# Patient Record
Sex: FEMALE | Race: WHITE | NOT HISPANIC OR LATINO | Employment: OTHER | URBAN - METROPOLITAN AREA
[De-identification: names, ages, dates, MRNs, and addresses within clinical notes are randomized per-mention and may not be internally consistent; named-entity substitution may affect disease eponyms.]

---

## 2017-04-12 ENCOUNTER — OFFICE VISIT (OUTPATIENT)
Dept: OPHTHALMOLOGY | Facility: CLINIC | Age: 72
End: 2017-04-12
Payer: MEDICARE

## 2017-04-12 DIAGNOSIS — H25.11 SENILE NUCLEAR SCLEROSIS, RIGHT: ICD-10-CM

## 2017-04-12 DIAGNOSIS — H35.3190 NONEXUDATIVE SENILE MACULAR DEGENERATION OF RETINA: ICD-10-CM

## 2017-04-12 DIAGNOSIS — H35.372 EPIRETINAL MEMBRANE, LEFT: ICD-10-CM

## 2017-04-12 DIAGNOSIS — H25.12 SENILE NUCLEAR SCLEROSIS, LEFT: Primary | ICD-10-CM

## 2017-04-12 PROCEDURE — 92136 OPHTHALMIC BIOMETRY: CPT | Mod: LT,S$GLB,, | Performed by: OPHTHALMOLOGY

## 2017-04-12 PROCEDURE — 99999 PR PBB SHADOW E&M-EST. PATIENT-LVL II: CPT | Mod: PBBFAC,,, | Performed by: OPHTHALMOLOGY

## 2017-04-12 PROCEDURE — 92014 COMPRE OPH EXAM EST PT 1/>: CPT | Mod: S$GLB,,, | Performed by: OPHTHALMOLOGY

## 2017-04-12 RX ORDER — POLYMYXIN B SULFATE AND TRIMETHOPRIM 1; 10000 MG/ML; [USP'U]/ML
1 SOLUTION OPHTHALMIC EVERY 4 HOURS
Qty: 1 BOTTLE | Refills: 1 | Status: SHIPPED | OUTPATIENT
Start: 2017-04-12 | End: 2017-04-22

## 2017-04-12 RX ORDER — DIFLUPREDNATE OPHTHALMIC 0.5 MG/ML
1 EMULSION OPHTHALMIC 4 TIMES DAILY
Qty: 1 BOTTLE | Refills: 1 | Status: SHIPPED | OUTPATIENT
Start: 2017-04-12 | End: 2017-05-12

## 2017-04-12 NOTE — PROGRESS NOTES
HPI     Last saw [facility] in 2015 patient c/o blurred vision at night when driving due   to headlight glare.       Last edited by MARIA E tOt on 4/12/2017  9:49 AM.         Assessment /Plan     For exam results, see Encounter Report.      ICD-10-CM ICD-9-CM    1. Senile nuclear sclerosis, left H25.12 366.16 Visually Significant Cataract OS > OD  Patient reports decreased vision consistent with the clinical amount of lenticular opacity, which reaches the level of visual significance and affects activities of daily living including reading and glare. Risks, benefits, and alternatives to cataract surgery were discussed.   The pt expressed a desire to proceed with surgery with the potential for some reasonable degree of visual improvement.    Discussed IOL options and refractive outcomes for this patient.    Phaco left eye,   pt defers specialty lenses   Block  monofocal IOL.  Will aim for distance  Referral to [facility] for Ophthalmic surgery  Prescriptions sent for preoperative medications  Durezol, Polytrim, and Ilevro  Explained that patient may need glasses after surgery.  Discussed that vision may be limited by retina          2. Senile nuclear sclerosis, right H25.11 366.16 See above    3. Epiretinal membrane, left H35.372 362.56 Appears stable, may limit vision after phaco    4. Nonexudative senile macular degeneration of retina H35.3190 362.51 Stable, yet may limit vision after phaco

## 2017-04-21 ENCOUNTER — OFFICE VISIT (OUTPATIENT)
Dept: OPHTHALMOLOGY | Facility: CLINIC | Age: 72
End: 2017-04-21
Payer: MEDICARE

## 2017-04-21 DIAGNOSIS — Z98.42 CATARACT EXTRACTION STATUS, LEFT: Primary | ICD-10-CM

## 2017-04-21 PROCEDURE — 99024 POSTOP FOLLOW-UP VISIT: CPT | Mod: S$GLB,,, | Performed by: OPHTHALMOLOGY

## 2017-04-21 NOTE — PROGRESS NOTES
"HPI     LAST SAW [facility] 12/29/15- REFERRED BY [facility] IN 2015  AA SMD  PCIOL OS 4/21/2017   PVD, floaters  No holes or tears, looks good  Dry "AA" SMD       Last edited by Zach Kearney MD on 4/21/2017 11:24 AM.         Assessment /Plan     For exam results, see Encounter Report.      ICD-10-CM ICD-9-CM    1. Cataract extraction status, left Z98.42 V45.61        PO Day 1 S/P Phaco/IOL  left eye  Doing well.    Use Durezol QID  Ilevro daily  Polymyxin B 4 x day  Reinstructed in importance of absolute compliance with Post-OP instructions including medications, shield at bedtime, and limitation of activities. Follow up appointments in approximately one and six weeks or call immediately for increased pain, redness or vision loss.                       "

## 2017-04-26 ENCOUNTER — OFFICE VISIT (OUTPATIENT)
Dept: OPHTHALMOLOGY | Facility: CLINIC | Age: 72
End: 2017-04-26
Payer: MEDICARE

## 2017-04-26 DIAGNOSIS — H25.11 SENILE NUCLEAR SCLEROSIS, RIGHT: ICD-10-CM

## 2017-04-26 DIAGNOSIS — Z98.42 CATARACT EXTRACTION STATUS, LEFT: Primary | ICD-10-CM

## 2017-04-26 PROCEDURE — 99024 POSTOP FOLLOW-UP VISIT: CPT | Mod: S$GLB,,, | Performed by: OPHTHALMOLOGY

## 2017-04-26 PROCEDURE — 99999 PR PBB SHADOW E&M-EST. PATIENT-LVL I: CPT | Mod: PBBFAC,,, | Performed by: OPHTHALMOLOGY

## 2017-04-26 RX ORDER — CIPROFLOXACIN 500 MG/1
TABLET ORAL
COMMUNITY
Start: 2017-04-24 | End: 2019-04-22 | Stop reason: ALTCHOICE

## 2017-04-26 RX ORDER — CHOLECALCIFEROL (VITAMIN D3) 125 MCG
1000 TABLET ORAL
COMMUNITY

## 2017-04-26 RX ORDER — ONDANSETRON 4 MG/1
4 TABLET, FILM COATED ORAL
COMMUNITY
Start: 2016-09-15 | End: 2018-04-16

## 2017-04-26 RX ORDER — METRONIDAZOLE 500 MG/1
TABLET ORAL
COMMUNITY
Start: 2017-04-24 | End: 2018-04-16

## 2017-04-26 RX ORDER — FLUTICASONE FUROATE AND VILANTEROL 200; 25 UG/1; UG/1
1 POWDER RESPIRATORY (INHALATION)
COMMUNITY
Start: 2017-03-16 | End: 2018-04-16

## 2017-04-26 NOTE — PROGRESS NOTES
"HPI     Post-op Evaluation    Additional comments: OS DUREZOL QID, POLYTRIM QID, ILEVRO ONCE DAILY           Comments   Pt states that her vision was really good, but when she woke up today, she   noticed her vision is a bit blurrier than it was. She states that she was   also feeling some scratchiness. 100% compliant with gtts. No pain.     LAST SAW [facility] 12/29/15- REFERRED BY [facility] IN 2015  AA SMD  PCIOL OS SN60WF+22.5 /CDE 12.69  PVD, floaters  No holes or tears, looks good  Dry "AA" SMD        OS DUREZOL QID, POLYTRIM QID, ILEVRO ONCE DAILY       Last edited by Erik Brink, Patient Care Assistant on 4/26/2017  3:08   PM. (History)            Assessment /Plan     For exam results, see Encounter Report.      ICD-10-CM ICD-9-CM    1. Cataract extraction status, left Z98.42 V45.61 Myopic outcome - unexplained   iol placed was the intended iol  Consider Ora in the future   2. Senile nuclear sclerosis, right H25.11 366.16        PO Week 1 S/P Phaco/ IOL left eye:   Doing well with no evidence of infection or abnormal inflammation.     D/C antibiotic drops  Taper Durezol weekly per instruction sheet.  Ilevro daily 7 more days.  Resume normal activitites and d/c eye shield.  OTC reading glasses can be used until evaluated for final MR.  D/c Shield at night    RTC 5 weeks or PRN pain, redness, vision loss, or other concerns.                     "

## 2017-05-31 ENCOUNTER — OFFICE VISIT (OUTPATIENT)
Dept: OPHTHALMOLOGY | Facility: CLINIC | Age: 72
End: 2017-05-31
Payer: MEDICARE

## 2017-05-31 DIAGNOSIS — H25.11 SENILE NUCLEAR SCLEROSIS, RIGHT: ICD-10-CM

## 2017-05-31 DIAGNOSIS — Z98.42 CATARACT EXTRACTION STATUS, LEFT: Primary | ICD-10-CM

## 2017-05-31 PROCEDURE — 99024 POSTOP FOLLOW-UP VISIT: CPT | Mod: S$GLB,,, | Performed by: OPHTHALMOLOGY

## 2017-05-31 PROCEDURE — 92136 OPHTHALMIC BIOMETRY: CPT | Mod: 26,RT,S$GLB, | Performed by: OPHTHALMOLOGY

## 2017-05-31 PROCEDURE — 99999 PR PBB SHADOW E&M-EST. PATIENT-LVL II: CPT | Mod: PBBFAC,,, | Performed by: OPHTHALMOLOGY

## 2017-05-31 RX ORDER — POLYMYXIN B SULFATE AND TRIMETHOPRIM 1; 10000 MG/ML; [USP'U]/ML
1 SOLUTION OPHTHALMIC 4 TIMES DAILY
Qty: 1 BOTTLE | Refills: 1 | Status: SHIPPED | OUTPATIENT
Start: 2017-05-31 | End: 2017-06-10

## 2017-05-31 RX ORDER — DIFLUPREDNATE OPHTHALMIC 0.5 MG/ML
1 EMULSION OPHTHALMIC 4 TIMES DAILY
Qty: 1 BOTTLE | Refills: 0 | Status: SHIPPED | OUTPATIENT
Start: 2017-05-31 | End: 2017-06-30

## 2017-05-31 NOTE — PROGRESS NOTES
HPI     Patient states her vision in OS is good and ready to schedule Phaco OD.   C/o trouble driving    Last edited by Zach Kearney MD on 5/31/2017 11:44 AM. (History)            Assessment /Plan     For exam results, see Encounter Report.      ICD-10-CM ICD-9-CM    1. Cataract extraction status, left Z98.42 V45.61 Mild inflammation. Resume durezol   2. Senile nuclear sclerosis, right H25.11 366.16   Patient reports decreased vision in the fellow eye consistent with the clinical amount of lenticular opacity, which reaches the level of visual significance and affects activities of daily living including reading and glare. Risks, benefits, and alternatives to cataract surgery were discussed and pt desired to schedule cataract surgery. Pt was consented and the biometry and lens options were reviewed.    Phaco right   Block   Requests a monofical  IOL.  Will aim for distance  Patient given prescriptions for Polytrim, Durezol, and Ilevro  Explained that patient may need glasses after surgery.  Discussed that vision may be limited by astigmatism    Declined toric. +22.0 WF        IOL Master - MOD 26 - OD- Right eye      Ambulatory Referral to External Surgery      polymyxin B sulf-trimethoprim (POLYTRIM) 10,000 unit- 1 mg/mL Drop      nepafenac (ILEVRO) 0.3 % DrpS      difluprednate (DUREZOL) 0.05 % Drop ophthalmic solution       Resume durezol bid os due to mild inflammation today.     Return to clinic for cataract surgery od.   Myopic outcome os.

## 2017-06-09 ENCOUNTER — OFFICE VISIT (OUTPATIENT)
Dept: OPHTHALMOLOGY | Facility: CLINIC | Age: 72
End: 2017-06-09
Payer: MEDICARE

## 2017-06-09 DIAGNOSIS — Z98.41 CATARACT EXTRACTION STATUS, RIGHT: Primary | ICD-10-CM

## 2017-06-09 PROCEDURE — 99999 PR PBB SHADOW E&M-EST. PATIENT-LVL I: CPT | Mod: PBBFAC,,, | Performed by: OPHTHALMOLOGY

## 2017-06-09 PROCEDURE — 99024 POSTOP FOLLOW-UP VISIT: CPT | Mod: S$GLB,,, | Performed by: OPHTHALMOLOGY

## 2017-06-09 NOTE — PROGRESS NOTES
"HPI     Post-op Evaluation    Additional comments: OD: Durezol QID, Polytrim QID, Ilevro QD           Comments   Pt states that she has some twitching in the temporal side of her right   eye. A little funny feeling, but no pain. VA improved. Pt is knowledgeable   of regiment.     LAST SAW [facility] 12/29/15- REFERRED BY [facility] IN 2015  AA SMD  PCIOL OS SN60WF+22.5 / 04/21/17 CDE 12.69  PCIOL OD 6/8/17  PVD, floaters  No holes or tears, looks good  Dry "AA" SMD    OD: Durezol QID, Polytrim QID, Ilevro QD       Last edited by Erik Brink, Patient Care Assistant on 6/9/2017  1:09   PM. (History)            Assessment /Plan     For exam results, see Encounter Report.      ICD-10-CM ICD-9-CM    1. Cataract extraction status, right Z98.41 V45.61        PO Day 1 S/P Phaco/IOL  right eye  Doing well.    Use Durezol QID  Ilevro daily  Polymyxin B 4 x day  Reinstructed in importance of absolute compliance with Post-OP instructions including medications, shield at bedtime, and limitation of activities. Follow up appointments in approximately one and six weeks or call immediately for increased pain, redness or vision loss.                       "

## 2017-06-19 ENCOUNTER — OFFICE VISIT (OUTPATIENT)
Dept: OPHTHALMOLOGY | Facility: CLINIC | Age: 72
End: 2017-06-19
Payer: MEDICARE

## 2017-06-19 DIAGNOSIS — Z98.42 CATARACT EXTRACTION STATUS, LEFT: Primary | ICD-10-CM

## 2017-06-19 DIAGNOSIS — Z98.41 CATARACT EXTRACTION STATUS, RIGHT: ICD-10-CM

## 2017-06-19 PROCEDURE — 99999 PR PBB SHADOW E&M-EST. PATIENT-LVL I: CPT | Mod: PBBFAC,,, | Performed by: OPHTHALMOLOGY

## 2017-06-19 PROCEDURE — 99024 POSTOP FOLLOW-UP VISIT: CPT | Mod: S$GLB,,, | Performed by: OPHTHALMOLOGY

## 2017-06-19 NOTE — PROGRESS NOTES
"HPI     s/pPCIOL OD +22.0 SN60WF / CDE: 10.25 / 6/8/17.  PCIOL OS SN60WF+22.5 /   04/21/17 CDE 12.69.  Pt hit head on a metal sign last Monday, had a   concussion.  No changes in vision per pt.      AA SMD  PCIOL OS SN60WF+22.5 / 04/21/17 CDE 12.69  PCIOL OD +22.0 SN60WF / CDE: 10.25 / 6/8/17  PVD, floaters  No holes or tears, looks good  Dry "AA" SMD    Durezol qid OD.      Last edited by Zohreh Beatty on 6/19/2017 10:32 AM. (History)            Assessment /Plan     For exam results, see Encounter Report.      ICD-10-CM ICD-9-CM    1. Cataract extraction status, left Z98.42 V45.61    2. Cataract extraction status, right Z98.41 V45.61        PO Week 1 S/P Phaco/ IOL left eye:   Doing well with no evidence of infection or abnormal inflammation.     D/C antibiotic drops  Taper Durezol weekly per instruction sheet.  Ilevro daily 7 more days.  Resume normal activitites and d/c eye shield.  OTC reading glasses can be used until evaluated for final MR.  D/c Shield at night    RTC 4 weeks or PRN pain, redness, vision loss, or other concerns.                     "

## 2017-07-17 ENCOUNTER — OFFICE VISIT (OUTPATIENT)
Dept: OPHTHALMOLOGY | Facility: CLINIC | Age: 72
End: 2017-07-17
Payer: MEDICARE

## 2017-07-17 DIAGNOSIS — Z98.42 CATARACT EXTRACTION STATUS, LEFT: ICD-10-CM

## 2017-07-17 DIAGNOSIS — H35.3190 NONEXUDATIVE SENILE MACULAR DEGENERATION OF RETINA: ICD-10-CM

## 2017-07-17 DIAGNOSIS — Z98.41 CATARACT EXTRACTION STATUS, RIGHT: Primary | ICD-10-CM

## 2017-07-17 DIAGNOSIS — H35.372 EPIRETINAL MEMBRANE, LEFT: ICD-10-CM

## 2017-07-17 PROCEDURE — 99024 POSTOP FOLLOW-UP VISIT: CPT | Mod: S$GLB,,, | Performed by: OPHTHALMOLOGY

## 2017-07-17 NOTE — PROGRESS NOTES
"HPI     C/c pt using otc readers, +3.00 but they give her a headache after   reading awhile- using  smaller strength and cant see well out of those   either- wants new glasses rx     LAST SAW [facility] 12/29/15- REFERRED BY [facility] IN 2015  AA SMD  PCIOL OS SN60WF+22.5 / 04/21/17 CDE 12.69  PCIOL OD +22.0 SN60WF / CDE: 10.25 / 6/8/17  PVD, floaters  No holes or tears, looks good  Dry "AA" SMD    Last edited by Zach Kearney MD on 7/17/2017 10:53 AM. (History)            Assessment /Plan     For exam results, see Encounter Report.      ICD-10-CM ICD-9-CM    1. Cataract extraction status, left Z98.42 V45.61 PO Month 1 S/P Phaco/IOL bilateral eye:   Doing Well.    Discontinue medications as per taper sheet.  Dispense PAL Rx  RTC 6 months with Dr. Esquivel         2. Cataract extraction status, right Z98.41 V45.61 Well   3. Nonexudative senile macular degeneration of retina H35.3190 362.51 Followed by [facility]    4. Epiretinal membrane, left H35.372 362.56 Followed by [facility]        RETURN TO CLINIC 9 months with Dr Esquivel to resume care               "

## 2017-11-17 ENCOUNTER — TELEPHONE (OUTPATIENT)
Dept: GASTROENTEROLOGY | Facility: CLINIC | Age: 72
End: 2017-11-17

## 2017-11-17 DIAGNOSIS — R13.10 DYSPHAGIA, UNSPECIFIED TYPE: ICD-10-CM

## 2017-11-17 DIAGNOSIS — K22.70 BARRETT'S ESOPHAGUS DETERMINED BY ENDOSCOPY: Primary | ICD-10-CM

## 2017-11-17 NOTE — TELEPHONE ENCOUNTER
----- Message from Sorin Benton MD sent at 11/17/2017  9:23 AM CST -----  Regarding: RE: Outside patient referral  Reina/Rosi- Reviewed records. I think this would be a good case for EGD bx and possible EMR. VLE would also be good, so perhaps we can aim to do this with Dr. Pastrana at [facility]. He is there next week, so can we try to get her scheduled with him there next week?    ----- Message -----  From: Jennifer Pearson MA  Sent: 11/16/2017   5:02 PM  To: Sorin Benton MD  Subject: FW: Outside patient referral                     Please review and advise  ----- Message -----  From: Nora Nova RN  Sent: 11/16/2017   4:51 PM  To: Jennifer Pearson MA, Ginette Samayoa  Subject: RE: Outside patient referral                     Hi Ginette,  This is for AES.    Reina,  This is the pt we spoke about.    Thank you,  Nora  ----- Message -----  From: Ginette Samayoa  Sent: 11/16/2017  10:35 AM  To: Nora Nova, RN  Subject: Outside patient referral                         Hi!    Patient being referred for dysplasia at the [facility]. The referral was to Dr. Benton and you. I was going to route to Dr. Brown but wanted check with you to make sure I'm not missing something with this patient. I scanned the referral into  and was going to send over a referral checklist for additional records.    Thanks!  Ginette

## 2017-11-22 ENCOUNTER — SURGERY (OUTPATIENT)
Age: 72
End: 2017-11-22

## 2017-11-22 ENCOUNTER — ANESTHESIA (OUTPATIENT)
Dept: ENDOSCOPY | Facility: HOSPITAL | Age: 72
End: 2017-11-22
Payer: MEDICARE

## 2017-11-22 ENCOUNTER — HOSPITAL ENCOUNTER (OUTPATIENT)
Facility: HOSPITAL | Age: 72
Discharge: HOME | End: 2017-11-22
Attending: INTERNAL MEDICINE | Admitting: INTERNAL MEDICINE
Payer: MEDICARE

## 2017-11-22 ENCOUNTER — ANESTHESIA EVENT (OUTPATIENT)
Dept: ENDOSCOPY | Facility: HOSPITAL | Age: 72
End: 2017-11-22
Payer: MEDICARE

## 2017-11-22 VITALS
WEIGHT: 145 LBS | SYSTOLIC BLOOD PRESSURE: 139 MMHG | DIASTOLIC BLOOD PRESSURE: 68 MMHG | RESPIRATION RATE: 18 BRPM | HEIGHT: 63 IN | HEART RATE: 59 BPM | TEMPERATURE: 98 F | OXYGEN SATURATION: 99 % | BODY MASS INDEX: 25.69 KG/M2

## 2017-11-22 DIAGNOSIS — K31.9 LESION OF STOMACH: Primary | ICD-10-CM

## 2017-11-22 DIAGNOSIS — Z01.818 PREOP EXAMINATION: ICD-10-CM

## 2017-11-22 LAB
ANION GAP SERPL CALC-SCNC: 10 MMOL/L
BUN SERPL-MCNC: 11 MG/DL
CALCIUM SERPL-MCNC: 9.5 MG/DL
CHLORIDE SERPL-SCNC: 107 MMOL/L
CO2 SERPL-SCNC: 25 MMOL/L
CREAT SERPL-MCNC: 0.9 MG/DL
EST. GFR  (AFRICAN AMERICAN): >60 ML/MIN/1.73 M^2
EST. GFR  (NON AFRICAN AMERICAN): >60 ML/MIN/1.73 M^2
GLUCOSE SERPL-MCNC: 109 MG/DL
POTASSIUM SERPL-SCNC: 4.2 MMOL/L
SODIUM SERPL-SCNC: 142 MMOL/L

## 2017-11-22 PROCEDURE — 27201012 HC FORCEPS, HOT/COLD, DISP: Performed by: INTERNAL MEDICINE

## 2017-11-22 PROCEDURE — 27201693 HC INFLATION SYSTEM, NVISION ULE: Performed by: INTERNAL MEDICINE

## 2017-11-22 PROCEDURE — 43252 EGD OPTICAL ENDOMICROSCOPY: CPT | Mod: 51,,, | Performed by: INTERNAL MEDICINE

## 2017-11-22 PROCEDURE — 43239 EGD BIOPSY SINGLE/MULTIPLE: CPT | Mod: ,,, | Performed by: INTERNAL MEDICINE

## 2017-11-22 PROCEDURE — 37000008 HC ANESTHESIA 1ST 15 MINUTES: Performed by: INTERNAL MEDICINE

## 2017-11-22 PROCEDURE — 63600175 PHARM REV CODE 636 W HCPCS: Performed by: NURSE ANESTHETIST, CERTIFIED REGISTERED

## 2017-11-22 PROCEDURE — 43239 EGD BIOPSY SINGLE/MULTIPLE: CPT | Performed by: INTERNAL MEDICINE

## 2017-11-22 PROCEDURE — 43252 EGD OPTICAL ENDOMICROSCOPY: CPT | Performed by: INTERNAL MEDICINE

## 2017-11-22 PROCEDURE — 93010 ELECTROCARDIOGRAM REPORT: CPT | Mod: ,,, | Performed by: INTERNAL MEDICINE

## 2017-11-22 PROCEDURE — 25000003 PHARM REV CODE 250: Performed by: INTERNAL MEDICINE

## 2017-11-22 PROCEDURE — 88312 SPECIAL STAINS GROUP 1: CPT | Mod: 26,,, | Performed by: PATHOLOGY

## 2017-11-22 PROCEDURE — 88305 TISSUE EXAM BY PATHOLOGIST: CPT | Performed by: PATHOLOGY

## 2017-11-22 PROCEDURE — 88305 TISSUE EXAM BY PATHOLOGIST: CPT | Mod: 26,,, | Performed by: PATHOLOGY

## 2017-11-22 PROCEDURE — 80048 BASIC METABOLIC PNL TOTAL CA: CPT

## 2017-11-22 PROCEDURE — 27201694 HC PROBE, OPTICAL ENDOMICROSCOPY: Performed by: INTERNAL MEDICINE

## 2017-11-22 PROCEDURE — 37000009 HC ANESTHESIA EA ADD 15 MINS: Performed by: INTERNAL MEDICINE

## 2017-11-22 PROCEDURE — 93005 ELECTROCARDIOGRAM TRACING: CPT

## 2017-11-22 RX ORDER — SODIUM CHLORIDE 9 MG/ML
INJECTION, SOLUTION INTRAVENOUS CONTINUOUS
Status: DISCONTINUED | OUTPATIENT
Start: 2017-11-22 | End: 2017-11-22 | Stop reason: HOSPADM

## 2017-11-22 RX ORDER — LIDOCAINE HCL/PF 100 MG/5ML
SYRINGE (ML) INTRAVENOUS
Status: DISCONTINUED | OUTPATIENT
Start: 2017-11-22 | End: 2017-11-22

## 2017-11-22 RX ORDER — PROPOFOL 10 MG/ML
VIAL (ML) INTRAVENOUS
Status: DISCONTINUED | OUTPATIENT
Start: 2017-11-22 | End: 2017-11-22

## 2017-11-22 RX ORDER — FENTANYL CITRATE 50 UG/ML
INJECTION, SOLUTION INTRAMUSCULAR; INTRAVENOUS
Status: DISCONTINUED | OUTPATIENT
Start: 2017-11-22 | End: 2017-11-22

## 2017-11-22 RX ORDER — PROPOFOL 10 MG/ML
VIAL (ML) INTRAVENOUS CONTINUOUS PRN
Status: DISCONTINUED | OUTPATIENT
Start: 2017-11-22 | End: 2017-11-22

## 2017-11-22 RX ADMIN — SODIUM CHLORIDE: 0.9 INJECTION, SOLUTION INTRAVENOUS at 07:11

## 2017-11-22 RX ADMIN — FENTANYL CITRATE 50 MCG: 50 INJECTION, SOLUTION INTRAMUSCULAR; INTRAVENOUS at 09:11

## 2017-11-22 RX ADMIN — LIDOCAINE HYDROCHLORIDE 75 MG: 20 INJECTION, SOLUTION INTRAVENOUS at 09:11

## 2017-11-22 RX ADMIN — PROPOFOL 40 MG: 10 INJECTION, EMULSION INTRAVENOUS at 09:11

## 2017-11-22 RX ADMIN — PROPOFOL 30 MG: 10 INJECTION, EMULSION INTRAVENOUS at 09:11

## 2017-11-22 RX ADMIN — FENTANYL CITRATE 25 MCG: 50 INJECTION, SOLUTION INTRAMUSCULAR; INTRAVENOUS at 09:11

## 2017-11-22 RX ADMIN — PROPOFOL 125 MCG/KG/MIN: 10 INJECTION, EMULSION INTRAVENOUS at 09:11

## 2017-11-22 NOTE — DISCHARGE INSTRUCTIONS
Post EGD Discharge Instruction    Kalia Woods  11/22/2017  Ravinder Pastrana MD    RESTRICTIONS ON ACTIVITY:    -DO NOT drive a car, operate machinery or make critical decisions, or do activities that require coordination or balance for 24 hours.  -Following Day: Return to full activities including work.  -Diet: Eat and drink normally unless instructed otherwise.    TREATMENT FOR COMMON SIDE EFFECTS:  *Sore Throat - treat with throat lozenges, gargle with warm salt water.  *Mild abdominal pain & bloating- rest and take liquids only.    SYMPTOMS TO WATCH FOR AND REPORT TO YOUR PHYSICIAN:  1. Chills or fever occurring 24 hours after procedure.  2. Pain in chest.  3. SEVERE abdominal pain or bloating.  4. Rectal bleeding which could be maroon or black.    If you have any questions or problems, please call your Physician:    Ravinder Pastrana MD     If a complication or emergency situation arises and you are unable to reach your Physician - GO TO THE EMERGENCY ROOM.

## 2017-11-22 NOTE — ANESTHESIA PREPROCEDURE EVALUATION
11/22/2017  Kalia Woods is a 72 y.o., female for EGD with bx poss EMR/VLE under MAC/[facility]    Anesthesia Evaluation     I have reviewed the Nursing Notes.   I have reviewed the Medications.     Review of Systems  Anesthesia Hx:  Personal Hx of Anesthesia complications, Post-Operative Nausea/Vomiting (zofran), in the past, but not with recent anesthetics / prophylaxis   Social:  No Alcohol Use, Non-Smoker   Hematology/Oncology:         -- Cancer in past history: Breast surgery    Cardiovascular:   Exercise tolerance: good Hypertension Past MI (10/2016 and 2015) CAD asymptomatic  Angina (stable) hyperlipidemia ECG has been reviewed.    Pulmonary:   Asthma    Renal/:   Chronic Renal Disease, CRI    Hepatic/GI:   powers's   Musculoskeletal:   Arthritis   Spine Disorders: Degenerative disease    Endocrine:   Hypothyroidism        Physical Exam  General:  Well nourished    Airway/Jaw/Neck:  Airway Findings: Mouth Opening: Normal General Airway Assessment: Adult      Dental:  Dental Findings: Periodontal disease, Severe   Chest/Lungs:  Chest/Lungs Clear    Heart/Vascular:  Heart Findings: Normal       Mental Status:  Mental Status Findings:  Alert and Oriented       Lab Results   Component Value Date     11/22/2017    K 4.2 11/22/2017     11/22/2017    CREATININE 0.9 11/22/2017    BUN 11 11/22/2017    CO2 25 11/22/2017           Anesthesia Plan  Type of Anesthesia, risks & benefits discussed:  Anesthesia Type:  MAC, general  Patient's Preference:   Intra-op Monitoring Plan:   Intra-op Monitoring Plan Comments:   Post Op Pain Control Plan:   Post Op Pain Control Plan Comments:   Induction:    Beta Blocker:  Patient is not currently on a Beta-Blocker (No further documentation required).       Informed Consent: Patient understands risks and agrees with Anesthesia plan.  Questions answered. Anesthesia  consent signed with patient.  ASA Score: 3     Day of Surgery Review of History & Physical:            Ready For Surgery From Anesthesia Perspective.

## 2017-11-22 NOTE — DISCHARGE SUMMARY
Discharge Summary/Instructions for after EGD with or without Biopsy    Kalia Woods    Wednesday, November 22, 2017  Ravinder Pastrana MD    RESTRICTIONS ON ACTIVITY:    - DO NOT drive a car or operate machinery until the day after the procedure.  - Following Day:  Return to full activities including work.  - Diet:  Eat and drink normally unless instructed otherwise.    TREATMENT FOR COMMON SIDE EFFECTS:  o  Sore Throat - treat with throat lozenges, gargle with warm salt water.  o  Mild abdominal pain & bloating - rest and eliza liquids only.    SYMPTOMS TO WATCH FOR AND REPORT TO YOUR PHYSICIAN:  1.  Chills or fever occurring within 24 hours after procedure.  2.  Pain in chest.  3.  SEVERE abdominal pain or bloating.  4.  Rectal bledding which bould be maroon or black stools.    Your doctor recommends these additional instructions:    None    You are being discharged to home.   We are waiting for your pathology results.   Your physician has recommended a repeat upper endoscopy in 10 weeks to check healing and for retreatment.      If you have any questions or problems, please call your physician.  EMERGENCY PHONE NUMBER: [phone]  LAB RESULTS: [phone]

## 2017-11-22 NOTE — H&P
History & Physical - Short Stay  Gastroenterology      SUBJECTIVE:     Procedure: EGD    Chief Complaint/Indication for Procedure: Reflux    History of Present Illness:  Patient is a 72 y.o. female presents with GERD S/P Nissen fundoplication. Recent EGD showed evidence of abnormal changes in the cardia with biopsies indeterminate for dysplasia here for further evaluation    PTA Medications   Medication Sig    benazepril (LOTENSIN) 20 MG tablet Take 0 tablets by mouth.    cholecalciferol, vitamin D3, (VITAMIN D3) 1,000 unit Chew Take 0 tablets by mouth.    colchicine 0.6 mg tablet Take 0.6 mg by mouth.    ergocalciferol, vitamin D2, 2,000 unit Tab Take 1,000 Units by mouth.    fluticasone-vilanterol (BREO) 200-25 mcg/dose DsDv diskus inhaler Inhale 1 puff into the lungs.    isosorbide mononitrate (IMDUR) 60 MG 24 hr tablet     levothyroxine (SYNTHROID) 100 MCG tablet Take 100 mcg by mouth.    omeprazole (PRILOSEC) 40 MG capsule Take by mouth.    simvastatin (ZOCOR) 10 MG tablet Take 0 tablets by mouth.    verapamil (CALAN) 120 MG tablet Take 0 tablets by mouth.    zolpidem (AMBIEN) 10 mg Tab Take 1 tablet by mouth.    albuterol (PROVENTIL) 2.5 mg /3 mL (0.083 %) nebulizer solution 2.5 mg.    ciprofloxacin HCl (CIPRO) 500 MG tablet     furosemide (LASIX) 20 MG tablet     metronidazole (FLAGYL) 500 MG tablet     nitroGLYCERIN (NITROSTAT) 0.4 MG SL tablet as needed.    ondansetron (ZOFRAN) 4 MG tablet Take 4 mg by mouth.    potassium chloride SA (K-DUR,KLOR-CON) 10 MEQ tablet        Review of patient's allergies indicates:   Allergen Reactions    Aspirin      Other reaction(s): Shortness Of Breath    Hydrocodone-acetaminophen Anaphylaxis    Penicillins Other (See Comments)    Phenergan [promethazine] Other (See Comments)    Hydrocodone     Iodine and iodide containing products      Other reaction(s): Swelling    Iodine containing multivitamin     Letrozole Other (See Comments)     Depression,  suicidal thoughts.    Umeclidinium-vilanterol      Rapid heart rate, tongue swelling, and leg cramps    Sulfa (sulfonamide antibiotics)      Other reaction(s): Rash        Past Medical History:   Diagnosis Date    Asthma     Breast cancer     Cataract     Degenerative joint disease involving multiple joints 8/19/2013    Elevated cholesterol     Hypertension     Macular degeneration     Thyroid disorder      Past Surgical History:   Procedure Laterality Date    BLADDER SURGERY      BREAST SURGERY      CATARACT EXTRACTION W/  INTRAOCULAR LENS IMPLANT Left 04/20/2017    CATARACT EXTRACTION W/  INTRAOCULAR LENS IMPLANT Right 06/08/2017    HYSTERECTOMY      LAPAROSCOPIC NISSEN FUNDOPLICATION      TONSILLECTOMY       Family History   Problem Relation Age of Onset    Diabetes Sister      Social History   Substance Use Topics    Smoking status: Never Smoker    Smokeless tobacco: Never Used    Alcohol use No       Review of Systems:  Constitutional: no fever or chills  Respiratory: no cough or shortness of breath  Cardiovascular: no chest pain or palpitations  Gastrointestinal: no nausea or vomiting, no abdominal pain or change in bowel habits, positive for reflux symptoms    OBJECTIVE:     Vital Signs (Most Recent)  Temp: 97.7 °F (36.5 °C) (11/22/17 0701)  Pulse: 68 (11/22/17 0701)  Resp: 20 (11/22/17 0701)  BP: (!) 143/68 (11/22/17 0701)  SpO2: 98 % (11/22/17 0701)    Physical Exam:  General: well developed, well nourished  Lungs:  clear to auscultation bilaterally and normal respiratory effort  Heart: regular rate, S1, S2 normal  Abdomen: soft, non-tender non-distented; bowel sounds normal; no masses,  no organomegaly    Laboratory  CBC: No results for input(s): WBC, RBC, HGB, HCT, PLT, MCV, MCH, MCHC in the last 168 hours.  CMP:   Recent Labs  Lab 11/22/17 0707      CALCIUM 9.5      K 4.2   CO2 25      BUN 11   CREATININE 0.9     Coagulation: No results for input(s): LABPROT, INR,  APTT in the last 168 hours.      Diagnostic Results:      ASSESSMENT/PLAN:     Abnormal mucosa of the cardia    Plan: EGD    Anesthesia Plan: MAC    ASA Grade: ASA 2 - Patient with mild systemic disease with no functional limitations     The impression and plan was discussed in detail with the patient and family. All questions have been answered and the patient voices understanding of our plan at this point. The risk of the procedure was discussed in detail which includes but not limited to bleeding, infection, perforation in some cases requiring surgery with its spectrum of complications.

## 2017-11-22 NOTE — ANESTHESIA POSTPROCEDURE EVALUATION
"Anesthesia Post Evaluation    Patient: Kalia Woods    Procedure(s) Performed: Procedure(s) (LRB):  ESOPHAGOGASTRODUODENOSCOPY (EGD) W/BX/POSSIBLE EMR/VLE (N/A)    Final Anesthesia Type: MAC  Patient location during evaluation: GI PACU  Patient participation: Yes- Able to Participate  Level of consciousness: awake and alert  Post-procedure vital signs: reviewed and stable  Pain management: adequate  Airway patency: patent  PONV status at discharge: No PONV  Anesthetic complications: no      Cardiovascular status: blood pressure returned to baseline and hemodynamically stable  Respiratory status: unassisted, spontaneous ventilation and room air  Hydration status: euvolemic  Follow-up not needed.        Visit Vitals  BP (!) 143/68   Pulse 68   Temp 36.5 °C (97.7 °F)   Resp 20   Ht 5' 3" (1.6 m)   Wt 65.8 kg (145 lb)   SpO2 98%   Breastfeeding? No   BMI 25.69 kg/m²       Pain/Evelyn Score: Pain Assessment Performed: Yes (11/22/2017  7:10 AM)  Presence of Pain: denies (11/22/2017  7:10 AM)      "

## 2017-11-22 NOTE — TRANSFER OF CARE
"Anesthesia Transfer of Care Note    Patient: Kalia Woods    Procedure(s) Performed: Procedure(s) (LRB):  ESOPHAGOGASTRODUODENOSCOPY (EGD) W/BX/POSSIBLE EMR/VLE (N/A)    Patient location: GI    Anesthesia Type: MAC    Transport from OR: Transported from OR on room air with adequate spontaneous ventilation    Post pain: adequate analgesia    Post assessment: no apparent anesthetic complications    Post vital signs: stable    Level of consciousness: awake    Nausea/Vomiting: no nausea/vomiting    Complications: none    Transfer of care protocol was followed      Last vitals:   Visit Vitals  BP (!) 143/68   Pulse 68   Temp 36.5 °C (97.7 °F)   Resp 20   Ht 5' 3" (1.6 m)   Wt 65.8 kg (145 lb)   SpO2 98%   Breastfeeding? No   BMI 25.69 kg/m²     "

## 2017-11-30 ENCOUNTER — TELEPHONE (OUTPATIENT)
Dept: ENDOSCOPY | Facility: HOSPITAL | Age: 72
End: 2017-11-30

## 2017-12-04 ENCOUNTER — TELEPHONE (OUTPATIENT)
Dept: GASTROENTEROLOGY | Facility: CLINIC | Age: 72
End: 2017-12-04

## 2017-12-04 NOTE — TELEPHONE ENCOUNTER
----- Message from Ravinder Pastrana MD sent at 12/4/2017  2:42 PM CST -----  Please let the patient know the biopsies showed inflammation. No dysplasia. A stain for fungal infection is pending. We will await the stain to see if she need antifungals. I will recommend to keep her EGD in 10 weeks.

## 2017-12-04 NOTE — TELEPHONE ENCOUNTER
----- Message from Kecia Rodrigues MA sent at 12/4/2017 10:52 AM CST -----  Contact: [phone]  Victoriano  Calling for biopsy results.    [phone]

## 2017-12-12 ENCOUNTER — TELEPHONE (OUTPATIENT)
Dept: GASTROENTEROLOGY | Facility: CLINIC | Age: 72
End: 2017-12-12

## 2017-12-12 NOTE — TELEPHONE ENCOUNTER
----- Message from Kecia Rodrigues MA sent at 12/12/2017  9:13 AM CST -----  Contact: [phone] or [phone]   Victoriano  Would like to discuss her bx results in more detail and what her diagnosis actually is.       [phone] or [phone]

## 2017-12-13 ENCOUNTER — TELEPHONE (OUTPATIENT)
Dept: GASTROENTEROLOGY | Facility: CLINIC | Age: 72
End: 2017-12-13

## 2017-12-13 NOTE — TELEPHONE ENCOUNTER
----- Message from Cassy Cortes sent at 12/13/2017  2:35 PM CST -----  Contact: Self- [phone]  Victoriano- pt is returning a missed call to Reina about her test results- please call pt back at [phone]

## 2017-12-13 NOTE — TELEPHONE ENCOUNTER
----- Message from Ravinder Pastrana MD sent at 12/13/2017  9:55 AM CST -----  Please let the patient know there was no fungus identified.

## 2017-12-15 ENCOUNTER — TELEPHONE (OUTPATIENT)
Dept: GASTROENTEROLOGY | Facility: CLINIC | Age: 72
End: 2017-12-15

## 2017-12-15 DIAGNOSIS — K31.9 GASTRIC LESION: Primary | ICD-10-CM

## 2017-12-21 ENCOUNTER — TELEPHONE (OUTPATIENT)
Dept: GASTROENTEROLOGY | Facility: CLINIC | Age: 72
End: 2017-12-21

## 2017-12-21 NOTE — TELEPHONE ENCOUNTER
Spoke with patient. EGD scheduled for 1/31/18 at 11a. Reviewed prep instructions. Ms Woods verbalized understanding.

## 2018-01-23 ENCOUNTER — TELEPHONE (OUTPATIENT)
Dept: ENDOSCOPY | Facility: HOSPITAL | Age: 73
End: 2018-01-23

## 2018-01-31 ENCOUNTER — SURGERY (OUTPATIENT)
Age: 73
End: 2018-01-31

## 2018-01-31 ENCOUNTER — HOSPITAL ENCOUNTER (OUTPATIENT)
Facility: HOSPITAL | Age: 73
Discharge: HOME | End: 2018-01-31
Attending: INTERNAL MEDICINE | Admitting: INTERNAL MEDICINE
Payer: MEDICARE

## 2018-01-31 ENCOUNTER — ANESTHESIA (OUTPATIENT)
Dept: ENDOSCOPY | Facility: HOSPITAL | Age: 73
End: 2018-01-31
Payer: MEDICARE

## 2018-01-31 ENCOUNTER — ANESTHESIA EVENT (OUTPATIENT)
Dept: ENDOSCOPY | Facility: HOSPITAL | Age: 73
End: 2018-01-31
Payer: MEDICARE

## 2018-01-31 VITALS
DIASTOLIC BLOOD PRESSURE: 74 MMHG | RESPIRATION RATE: 16 BRPM | WEIGHT: 150 LBS | TEMPERATURE: 98 F | HEART RATE: 81 BPM | SYSTOLIC BLOOD PRESSURE: 144 MMHG | HEIGHT: 63 IN | BODY MASS INDEX: 26.58 KG/M2 | OXYGEN SATURATION: 98 %

## 2018-01-31 DIAGNOSIS — K31.9 LESION OF STOMACH: Primary | ICD-10-CM

## 2018-01-31 DIAGNOSIS — K31.9 GASTRIC LESION: ICD-10-CM

## 2018-01-31 PROCEDURE — 25000003 PHARM REV CODE 250: Performed by: INTERNAL MEDICINE

## 2018-01-31 PROCEDURE — D9220A PRA ANESTHESIA: Mod: CRNA,,, | Performed by: NURSE ANESTHETIST, CERTIFIED REGISTERED

## 2018-01-31 PROCEDURE — 25000003 PHARM REV CODE 250: Performed by: NURSE ANESTHETIST, CERTIFIED REGISTERED

## 2018-01-31 PROCEDURE — 88305 TISSUE EXAM BY PATHOLOGIST: CPT | Mod: 26,,, | Performed by: PATHOLOGY

## 2018-01-31 PROCEDURE — 27201012 HC FORCEPS, HOT/COLD, DISP: Performed by: INTERNAL MEDICINE

## 2018-01-31 PROCEDURE — 37000008 HC ANESTHESIA 1ST 15 MINUTES: Performed by: INTERNAL MEDICINE

## 2018-01-31 PROCEDURE — D9220A PRA ANESTHESIA: Mod: ANES,,, | Performed by: ANESTHESIOLOGY

## 2018-01-31 PROCEDURE — 43239 EGD BIOPSY SINGLE/MULTIPLE: CPT | Mod: ,,, | Performed by: INTERNAL MEDICINE

## 2018-01-31 PROCEDURE — 37000009 HC ANESTHESIA EA ADD 15 MINS: Performed by: INTERNAL MEDICINE

## 2018-01-31 PROCEDURE — 63600175 PHARM REV CODE 636 W HCPCS: Performed by: NURSE ANESTHETIST, CERTIFIED REGISTERED

## 2018-01-31 PROCEDURE — 88305 TISSUE EXAM BY PATHOLOGIST: CPT | Performed by: PATHOLOGY

## 2018-01-31 PROCEDURE — 43239 EGD BIOPSY SINGLE/MULTIPLE: CPT | Performed by: INTERNAL MEDICINE

## 2018-01-31 RX ORDER — SODIUM CHLORIDE 0.9 % (FLUSH) 0.9 %
3 SYRINGE (ML) INJECTION
Status: DISCONTINUED | OUTPATIENT
Start: 2018-01-31 | End: 2018-01-31 | Stop reason: HOSPADM

## 2018-01-31 RX ORDER — SODIUM CHLORIDE 9 MG/ML
INJECTION, SOLUTION INTRAVENOUS CONTINUOUS
Status: DISCONTINUED | OUTPATIENT
Start: 2018-01-31 | End: 2018-01-31 | Stop reason: HOSPADM

## 2018-01-31 RX ORDER — LIDOCAINE HYDROCHLORIDE 20 MG/ML
INJECTION, SOLUTION INFILTRATION; PERINEURAL
Status: DISCONTINUED | OUTPATIENT
Start: 2018-01-31 | End: 2018-01-31

## 2018-01-31 RX ORDER — DILTIAZEM HYDROCHLORIDE 180 MG/1
180 CAPSULE, COATED, EXTENDED RELEASE ORAL DAILY
COMMUNITY
End: 2018-04-16

## 2018-01-31 RX ORDER — PROPOFOL 10 MG/ML
VIAL (ML) INTRAVENOUS CONTINUOUS PRN
Status: DISCONTINUED | OUTPATIENT
Start: 2018-01-31 | End: 2018-01-31

## 2018-01-31 RX ORDER — GLYCOPYRROLATE 0.2 MG/ML
INJECTION INTRAMUSCULAR; INTRAVENOUS
Status: DISCONTINUED | OUTPATIENT
Start: 2018-01-31 | End: 2018-01-31

## 2018-01-31 RX ORDER — PROPOFOL 10 MG/ML
VIAL (ML) INTRAVENOUS
Status: DISCONTINUED | OUTPATIENT
Start: 2018-01-31 | End: 2018-01-31

## 2018-01-31 RX ADMIN — PROPOFOL 50 MG: 10 INJECTION, EMULSION INTRAVENOUS at 10:01

## 2018-01-31 RX ADMIN — SODIUM CHLORIDE: 0.9 INJECTION, SOLUTION INTRAVENOUS at 09:01

## 2018-01-31 RX ADMIN — PROPOFOL 150 MCG/KG/MIN: 10 INJECTION, EMULSION INTRAVENOUS at 10:01

## 2018-01-31 RX ADMIN — LIDOCAINE HYDROCHLORIDE 100 MG: 20 INJECTION, SOLUTION INFILTRATION; PERINEURAL at 10:01

## 2018-01-31 RX ADMIN — GLYCOPYRROLATE 0.2 MG: 0.2 INJECTION, SOLUTION INTRAMUSCULAR; INTRAVENOUS at 10:01

## 2018-01-31 NOTE — DISCHARGE INSTRUCTIONS
Upper GI Endoscopy     During endoscopy, a long, flexible tube is used to view the inside of your upper GI tract.      Upper GI endoscopy allows your healthcare provider to look directly into the beginning of your gastrointestinal (GI) tract. The esophagus, stomach, and duodenum (the first part of the small intestine) make up the upper GI tract.   Before the exam  Follow these and any other instructions you are given before your endoscopy. If you dont follow the healthcare providers instructions carefully, the test may need to be canceled or done over:  · Don't eat or drink anything after midnight the night before your exam. If your exam is in the afternoon, drink only clear liquids in the morning. Don't eat or drink anything for 8 hours before the exam. In some cases, you may be able to take medicines with sips of water until 2 hours before the procedure. Speak with your healthcare provider about this.   · Bring your X-rays and any other test results you have.  · Because you will be sedated, arrange for an adult to drive you home after the exam.  · Tell your healthcare provider before the exam if you are taking any medicines or have any medical problems.  The procedure  Here is what to expect:  · You will lie on the endoscopy table. Usually patients lie on the left side.  · You will be monitored and given oxygen.  · Your throat may be numbed with a spray or gargle. You are given medicine through an intravenous (IV) line that will help you relax and remain comfortable. You may be awake or asleep during the procedure.  · The healthcare provider will put the endoscope in your mouth and down your esophagus. It is thinner than most pieces of food that you swallow. It will not affect your breathing. The medicine helps keep you from gagging.  · Air is put into your GI tract to expand it. It can make you burp.  · During the procedure, the healthcare provider can take biopsies (tissue samples), remove abnormalities,  such as polyps, or treat abnormalities through a variety of devices placed through the endoscope. You will not feel this.   · The endoscope carries images of your upper GI tract to a video screen. If you are awake, you may be able to look at the images.  · After the procedure is done, you will rest for a time. An adult must drive you home.  When to call your healthcare provider  Contact your healthcare provider if you have:  · Black or tarry stools, or blood in your stool  · Fever  · Pain in your belly that does not go away  · Nausea and vomiting, or vomiting blood   Date Last Reviewed: 7/1/2016  © 2453-2751 [employer]. [address] [place] [address], [place] [address]. All rights reserved. This information is not intended as a substitute for professional medical care. Always follow your healthcare professional's instructions.             RESTRICTIONS:  During your procedure today, you received medications for sedation.  These medications may affect your judgment, balance and coordination.  Therefore, for 24 hours, you have the following restrictions:      - DO NOT drive a car, operate machinery, make legal/financial decisions, sign important papers or drink alcohol.       ACTIVITY:  The following day: return to full activity including work, except no heavy lifting, straining or running for 3 days if polyps were removed.     DIET:  Eat and drink normally unless instructed otherwise.                                    TREATMENT FOR COMMON SIDE EFFECTS:  - Mild abdominal pain, belching, bloating or excessive gas: rest, eat lightly and use a heating pad.  - Sore Throat: treat with throat lozenges and/or gargle with warm salt water.     SYMPTOMS TO WATCH FOR AND REPORT TO YOUR PHYSICIAN:  1. Abdominal pain or bloating, other than gas cramps.  2. Chest pain.  3. Back pain.  4. Chills or fever occurring within 24 hours after the procedure.  5. Rectal bleeding, which would show as bright red, maroon, or black  stools. (A tablespoon of blood from the rectum is not serious, especially if hemorrhoids are present.)  6. Vomiting.  7. Weakness or dizziness.  8. Because air was used during the procedure, expelling large amounts of air from your rectum or belching is normal.  9. If a bowel prep was taken, you may not have a bowel movement for 1-3 days.  This is normal.     GO DIRECTLY TO THE NEAREST EMERGENCY ROOM IF YOU HAVE ANY OF THE FOLLOWING:                 Difficulty breathing  Chills and/or fever over 101 F              Persistent vomiting and/or vomiting blood              Severe abdominal pain              Severe chest pain              Black, tarry stools              Bleeding- more than one tablespoon              Any other symptom or condition that you may feel needs urgent attention     Your doctor recommends these additional instructions:  If any biopsies were taken, your doctor s clinic will contact you in 1 to 2 weeks with any results.     You are being discharged to home.   We are waiting for your pathology results.   Follow an antireflux regimen.  This includes:       - Do not lie down for at least 3 to 4 hours after meals.        - Raise the head of the bed 4 to 6 inches.        - Decrease excess weight.        - Avoid citrus juices and other acidic foods, alcohol, chocolate, mints, coffee and other caffeinated beverages, carbonated beverages, fatty and fried foods.        - Avoid tight-fitting clothing.        - Avoid cigarettes and other tobacco products.   Take Prilosec (omeprazole) 40 mg by mouth once a day.   Your physician has recommended a repeat upper endoscopy for surveillance based on pathology results.     For questions, problems or results please call your physician - Ravinder Pastrana MD at Work:  [phone].     [facility], EMERGENCY ROOM PHONE NUMBER: [phone]     IF A COMPLICATION OR EMERGENCY SITUATION ARISES AND YOU ARE UNABLE TO REACH YOUR PHYSICIAN - GO DIRECTLY TO THE  EMERGENCY ROOM.

## 2018-01-31 NOTE — PLAN OF CARE
Discharge instructions reveiwed with pt and . Understanding verbalized. No complaints of pain reported. Pt able to tolerate PO intake. MD Pastrana to bedside to address findings. To be transported to car by PCT.

## 2018-01-31 NOTE — ANESTHESIA PREPROCEDURE EVALUATION
01/31/2018  Kalia Woods is a 72 y.o., female.    Anesthesia Evaluation         Review of Systems  Anesthesia Hx:  No problems with previous Anesthesia   Hematology/Oncology:         -- Cancer in past history: Breast right   Cardiovascular:   Hypertension hyperlipidemia    Pulmonary:   Asthma    Endocrine:   Hypothyroidism        Physical Exam  General:  Well nourished    Airway/Jaw/Neck:  Airway Findings: Mouth Opening: Normal Tongue: Normal  General Airway Assessment: Adult  Mallampati: II  TM Distance: Normal, at least 6 cm  Jaw/Neck Findings:     Neck ROM: Normal ROM      Dental:  Dental Findings: In tact   Chest/Lungs:  Chest/Lungs Findings: Clear to auscultation, Normal Respiratory Rate     Heart/Vascular:  Heart Findings: Rate: Normal  Rhythm: Regular Rhythm  Sounds: Normal        Mental Status:  Mental Status Findings:  Cooperative, Alert and Oriented         Anesthesia Plan  Type of Anesthesia, risks & benefits discussed:  Anesthesia Type:  general  Patient's Preference:   Intra-op Monitoring Plan: standard ASA monitors  Intra-op Monitoring Plan Comments:   Post Op Pain Control Plan:   Post Op Pain Control Plan Comments:   Induction:   IV  Beta Blocker:  Patient is not currently on a Beta-Blocker (No further documentation required).       Informed Consent: Patient understands risks and agrees with Anesthesia plan.  Questions answered. Anesthesia consent signed with patient.  ASA Score: 2     Day of Surgery Review of History & Physical:        Anesthesia Plan Notes: Very difficult IV stick        Ready For Surgery From Anesthesia Perspective.

## 2018-01-31 NOTE — PROVATION PATIENT INSTRUCTIONS
Discharge Summary/Instructions after an Endoscopic Procedure  Patient Name: Kalia Woods  Patient MRN: [id number]  Patient YOB: 1945 Wednesday, January 31, 2018  Ravinder Pastrana MD  RESTRICTIONS:  During your procedure today, you received medications for sedation.  These   medications may affect your judgment, balance and coordination.  Therefore,   for 24 hours, you have the following restrictions:   - DO NOT drive a car, operate machinery, make legal/financial decisions,   sign important papers or drink alcohol.    ACTIVITY:  The following day: return to full activity including work, except no heavy   lifting, straining or running for 3 days if polyps were removed.  DIET:  Eat and drink normally unless instructed otherwise.     TREATMENT FOR COMMON SIDE EFFECTS:  - Mild abdominal pain, belching, bloating or excessive gas: rest, eat   lightly and use a heating pad.  - Sore Throat: treat with throat lozenges and/or gargle with warm salt   water.  SYMPTOMS TO WATCH FOR AND REPORT TO YOUR PHYSICIAN:  1. Abdominal pain or bloating, other than gas cramps.  2. Chest pain.  3. Back pain.  4. Chills or fever occurring within 24 hours after the procedure.  5. Rectal bleeding, which would show as bright red, maroon, or black stools.   (A tablespoon of blood from the rectum is not serious, especially if   hemorrhoids are present.)  6. Vomiting.  7. Weakness or dizziness.  8. Because air was used during the procedure, expelling large amounts of air   from your rectum or belching is normal.  9. If a bowel prep was taken, you may not have a bowel movement for 1-3   days.  This is normal.  GO DIRECTLY TO THE NEAREST EMERGENCY ROOM IF YOU HAVE ANY OF THE FOLLOWING:      Difficulty breathing  Chills and/or fever over 101 F   Persistent vomiting and/or vomiting blood   Severe abdominal pain   Severe chest pain   Black, tarry stools   Bleeding- more than one tablespoon   Any other symptom or condition that you may feel  needs urgent attention  Your doctor recommends these additional instructions:  If any biopsies were taken, your doctor s clinic will contact you in 1 to 2   weeks with any results.  You are being discharged to home.   We are waiting for your pathology results.   Follow an antireflux regimen.  This includes:       - Do not lie down for at least 3 to 4 hours after meals.        - Raise the head of the bed 4 to 6 inches.        - Decrease excess weight.        - Avoid citrus juices and other acidic foods, alcohol, chocolate, mints,   coffee and other caffeinated beverages, carbonated beverages, fatty and   fried foods.        - Avoid tight-fitting clothing.        - Avoid cigarettes and other tobacco products.   Take Prilosec (omeprazole) 40 mg by mouth once a day.   Your physician has recommended a repeat upper endoscopy for surveillance   based on pathology results.  For questions, problems or results please call your physician - Ravinder Pastrana MD at Work:  [phone].  [facility], EMERGENCY ROOM PHONE NUMBER: [phone]  IF A COMPLICATION OR EMERGENCY SITUATION ARISES AND YOU ARE UNABLE TO REACH   YOUR PHYSICIAN - GO DIRECTLY TO THE EMERGENCY ROOM.  Ravinder Pastrana MD  1/31/2018 10:26:19 AM  This report has been verified and signed electronically.

## 2018-01-31 NOTE — DISCHARGE SUMMARY
Discharge Summary/Instructions after an Endoscopic Procedure    Patient Name: Kalia Woods  Patient MRN: [id number]  Patient YOB: 1945 Wednesday, January 31, 2018  Ravinder Pastrana MD    RESTRICTIONS:  During your procedure today, you received medications for sedation.  These medications may affect your judgment, balance and coordination.  Therefore, for 24 hours, you have the following restrictions:     - DO NOT drive a car, operate machinery, make legal/financial decisions, sign important papers or drink alcohol.      ACTIVITY:  The following day: return to full activity including work, except no heavy lifting, straining or running for 3 days if polyps were removed.    DIET:  Eat and drink normally unless instructed otherwise.     TREATMENT FOR COMMON SIDE EFFECTS:  - Mild abdominal pain, belching, bloating or excessive gas: rest, eat lightly and use a heating pad.  - Sore Throat: treat with throat lozenges and/or gargle with warm salt water.    SYMPTOMS TO WATCH FOR AND REPORT TO YOUR PHYSICIAN:  1. Abdominal pain or bloating, other than gas cramps.  2. Chest pain.  3. Back pain.  4. Chills or fever occurring within 24 hours after the procedure.  5. Rectal bleeding, which would show as bright red, maroon, or black stools. (A tablespoon of blood from the rectum is not serious, especially if hemorrhoids are present.)  6. Vomiting.  7. Weakness or dizziness.  8. Because air was used during the procedure, expelling large amounts of air from your rectum or belching is normal.  9. If a bowel prep was taken, you may not have a bowel movement for 1-3 days.  This is normal.    GO DIRECTLY TO THE NEAREST EMERGENCY ROOM IF YOU HAVE ANY OF THE FOLLOWING:     Difficulty breathing  Chills and/or fever over 101 F   Persistent vomiting and/or vomiting blood   Severe abdominal pain   Severe chest pain   Black, tarry stools   Bleeding- more than one tablespoon   Any other symptom or condition that you may feel needs  urgent attention    Your doctor recommends these additional instructions:  If any biopsies were taken, your doctor s clinic will contact you in 1 to 2 weeks with any results.    You are being discharged to home.   We are waiting for your pathology results.   Follow an antireflux regimen.  This includes:       - Do not lie down for at least 3 to 4 hours after meals.        - Raise the head of the bed 4 to 6 inches.        - Decrease excess weight.        - Avoid citrus juices and other acidic foods, alcohol, chocolate, mints, coffee and other caffeinated beverages, carbonated beverages, fatty and fried foods.        - Avoid tight-fitting clothing.        - Avoid cigarettes and other tobacco products.   Take Prilosec (omeprazole) 40 mg by mouth once a day.   Your physician has recommended a repeat upper endoscopy for surveillance based on pathology results.    For questions, problems or results please call your physician - Ravinder Pastrana MD at Work:  [phone].    [facility], EMERGENCY ROOM PHONE NUMBER: [phone]    IF A COMPLICATION OR EMERGENCY SITUATION ARISES AND YOU ARE UNABLE TO REACH YOUR PHYSICIAN - GO DIRECTLY TO THE EMERGENCY ROOM.

## 2018-01-31 NOTE — ANESTHESIA RELEASE NOTE
Anesthesia Release from PACU Note    Anesthesia Release from PACU note    Patient: Kalia Woods    Procedure(s) Performed: Procedure(s) (LRB):  ESOPHAGOGASTRODUODENOSCOPY (EGD) (N/A)    Anesthesia type: general    Post pain: Adequate analgesia    Post assessment: no apparent anesthetic complications, tolerated procedure well and no evidence of recall    Last Vitals:   Vitals:    01/31/18 1100   BP: (!) 144/74   Pulse: 81   Resp: 16   Temp: 36.8 °C (98.3 °F)   SpO2: 98%       Post vital signs: stable    Level of consciousness: awake, alert  and oriented    Nausea/Vomiting: no nausea/no vomiting    Complications: none    Airway Patency: patent    Respiratory: unassisted    Cardiovascular: stable and blood pressure at baseline    Hydration: euvolemic

## 2018-01-31 NOTE — H&P
History & Physical - Short Stay  Gastroenterology      SUBJECTIVE:     Procedure: EGD    Chief Complaint/Indication for Procedure: abnormal mucosa    History of Present Illness:  Patient is a 72 y.o. female presents with biopsies of the gastric cardia indeterminate to dysplasia. Repeat EGD did not showed a lesion in the cardia. Biopsies showed inflammatory changes. Patient is here for follow up.     PTA Medications   Medication Sig    benazepril (LOTENSIN) 20 MG tablet Take 0 tablets by mouth.    cholecalciferol, vitamin D3, (VITAMIN D3) 1,000 unit Chew Take 0 tablets by mouth.    diltiaZEM (CARDIZEM CD) 180 MG 24 hr capsule Take 180 mg by mouth once daily.    ergocalciferol, vitamin D2, 2,000 unit Tab Take 1,000 Units by mouth.    fluticasone-vilanterol (BREO) 200-25 mcg/dose DsDv diskus inhaler Inhale 1 puff into the lungs.    furosemide (LASIX) 20 MG tablet     isosorbide mononitrate (IMDUR) 60 MG 24 hr tablet     levothyroxine (SYNTHROID) 100 MCG tablet Take 100 mcg by mouth.    omeprazole (PRILOSEC) 40 MG capsule Take by mouth.    ondansetron (ZOFRAN) 4 MG tablet Take 4 mg by mouth.    potassium chloride SA (K-DUR,KLOR-CON) 10 MEQ tablet     simvastatin (ZOCOR) 10 MG tablet Take 0 tablets by mouth.    zolpidem (AMBIEN) 10 mg Tab Take 1 tablet by mouth.    albuterol (PROVENTIL) 2.5 mg /3 mL (0.083 %) nebulizer solution 2.5 mg.    ciprofloxacin HCl (CIPRO) 500 MG tablet     colchicine 0.6 mg tablet Take 0.6 mg by mouth.    metronidazole (FLAGYL) 500 MG tablet     nitroGLYCERIN (NITROSTAT) 0.4 MG SL tablet as needed.    verapamil (CALAN) 120 MG tablet Take 0 tablets by mouth.       Review of patient's allergies indicates:   Allergen Reactions    Aspirin      Other reaction(s): Shortness Of Breath    Hydrocodone-acetaminophen Anaphylaxis    Penicillins Other (See Comments)    Phenergan [promethazine] Other (See Comments)    Hydrocodone     Iodine and iodide containing products      Other  reaction(s): Swelling    Iodine containing multivitamin     Letrozole Other (See Comments)     Depression, suicidal thoughts.    Umeclidinium-vilanterol      Rapid heart rate, tongue swelling, and leg cramps    Sulfa (sulfonamide antibiotics)      Other reaction(s): Rash        Past Medical History:   Diagnosis Date    Asthma     Breast cancer     Cataract     Degenerative joint disease involving multiple joints 8/19/2013    Elevated cholesterol     Hypertension     Macular degeneration     Thyroid disorder      Past Surgical History:   Procedure Laterality Date    BLADDER SURGERY      BREAST SURGERY      CATARACT EXTRACTION W/  INTRAOCULAR LENS IMPLANT Left 04/20/2017    CATARACT EXTRACTION W/  INTRAOCULAR LENS IMPLANT Right 06/08/2017    CHOLECYSTECTOMY      HYSTERECTOMY      LAPAROSCOPIC NISSEN FUNDOPLICATION      rectum tumor      TONSILLECTOMY       Family History   Problem Relation Age of Onset    Diabetes Sister      Social History   Substance Use Topics    Smoking status: Never Smoker    Smokeless tobacco: Never Used    Alcohol use No       Review of Systems:  Constitutional: no fever or chills  Respiratory: no cough or shortness of breath  Cardiovascular: no chest pain or palpitations  Gastrointestinal: positive for reflux symptoms    OBJECTIVE:     Vital Signs (Most Recent)  Temp: 97.9 °F (36.6 °C) (01/31/18 0910)  Pulse: 80 (01/31/18 0910)  Resp: 16 (01/31/18 0910)  BP: 136/76 (01/31/18 0910)  SpO2: 99 % (01/31/18 0910)    Physical Exam:  General: well developed, well nourished  Lungs:  clear to auscultation bilaterally and normal respiratory effort  Heart: regular rate, S1, S2 normal  Abdomen: soft, non-tender non-distented; bowel sounds normal; no masses,  no organomegaly    Laboratory  CBC: No results for input(s): WBC, RBC, HGB, HCT, PLT, MCV, MCH, MCHC in the last 168 hours.  CMP: No results for input(s): GLU, CALCIUM, ALBUMIN, PROT, NA, K, CO2, CL, BUN, CREATININE, ALKPHOS,  ALT, AST, BILITOT in the last 168 hours.  LFT's: No results for input(s): ALT, AST, ALKPHOS, BILITOT, PROT, ALBUMIN in the last 168 hours.  Coagulation: No results for input(s): LABPROT, INR, APTT in the last 168 hours.      Diagnostic Results:      ASSESSMENT/PLAN:     Abnormal gastric cardia mucosa    Plan: EGD    Anesthesia Plan: MAC    ASA Grade: ASA 2 - Patient with mild systemic disease with no functional limitations      The impression and plan was discussed in detail with the patient and family. All questions have been answered and the patient voices understanding of our plan at this point. The risk of the procedure was discussed in detail which includes but not limited to bleeding, infection, perforation in some cases requiring surgery with its spectrum of complications.

## 2018-01-31 NOTE — ANESTHESIA POSTPROCEDURE EVALUATION
"Anesthesia Post Evaluation    Patient: Kalia Woods    Procedure(s) Performed: Procedure(s) (LRB):  ESOPHAGOGASTRODUODENOSCOPY (EGD) (N/A)    Final Anesthesia Type: general  Patient location during evaluation: PACU  Patient participation: Yes- Able to Participate  Level of consciousness: awake and alert  Post-procedure vital signs: reviewed and stable  Pain management: adequate  Airway patency: patent  PONV status at discharge: No PONV  Anesthetic complications: no      Cardiovascular status: hemodynamically stable and blood pressure returned to baseline  Respiratory status: unassisted and spontaneous ventilation  Hydration status: euvolemic  Follow-up not needed.        Visit Vitals  BP (!) 144/74   Pulse 81   Temp 36.8 °C (98.3 °F) (Skin)   Resp 16   Ht 5' 3" (1.6 m)   Wt 68 kg (150 lb)   SpO2 98%   Breastfeeding? No   BMI 26.57 kg/m²       Pain/Evelyn Score: Pain Assessment Performed: Yes (1/31/2018 11:00 AM)  Presence of Pain: denies (1/31/2018 11:00 AM)  Evelyn Score: 10 (1/31/2018 10:55 AM)      "

## 2018-02-06 ENCOUNTER — TELEPHONE (OUTPATIENT)
Dept: GASTROENTEROLOGY | Facility: CLINIC | Age: 73
End: 2018-02-06

## 2018-02-06 NOTE — TELEPHONE ENCOUNTER
----- Message from Cassy Cortes sent at 2/6/2018  3:35 PM CST -----  Contact: Self- [phone]  Victoriano- pt called to speak with Reina regarding her biopsy results- please call pt back at [phone]

## 2018-02-06 NOTE — TELEPHONE ENCOUNTER
Result Notes     Notes Recorded by Rvainder Pastrana MD on 2/2/2018 at 4:14 PM CST  Please let the patient know the biopsies of did not showed dysplasia or Salazar's. Need to follow up with Dr Zane Woodward and Surgery for possible fundoplication repair.     Left message

## 2018-02-06 NOTE — TELEPHONE ENCOUNTER
Result Notes     Notes Recorded by Ravinder Pastrana MD on 2/2/2018 at 4:14 PM CST  Please let the patient know the biopsies of did not showed dysplasia or Salazar's. Need to follow up with Dr Zane Woodward and Surgery for possible fundoplication repair.     Patient informed.

## 2018-04-16 ENCOUNTER — OFFICE VISIT (OUTPATIENT)
Dept: OPHTHALMOLOGY | Facility: CLINIC | Age: 73
End: 2018-04-16
Payer: MEDICARE

## 2018-04-16 DIAGNOSIS — H35.3131 EARLY STAGE NONEXUDATIVE AGE-RELATED MACULAR DEGENERATION OF BOTH EYES: Primary | ICD-10-CM

## 2018-04-16 PROCEDURE — 99999 PR PBB SHADOW E&M-EST. PATIENT-LVL II: CPT | Mod: PBBFAC,,, | Performed by: OPHTHALMOLOGY

## 2018-04-16 PROCEDURE — 92014 COMPRE OPH EXAM EST PT 1/>: CPT | Mod: S$GLB,,, | Performed by: OPHTHALMOLOGY

## 2018-04-16 RX ORDER — BENAZEPRIL HYDROCHLORIDE 20 MG/1
TABLET ORAL
COMMUNITY
Start: 2018-03-12

## 2018-04-16 RX ORDER — DILTIAZEM HYDROCHLORIDE 180 MG/1
180 CAPSULE, COATED, EXTENDED RELEASE ORAL
COMMUNITY

## 2018-04-16 RX ORDER — ALBUTEROL SULFATE 90 UG/1
AEROSOL, METERED RESPIRATORY (INHALATION)
COMMUNITY
Start: 2018-03-19 | End: 2019-03-19

## 2018-04-16 RX ORDER — ONDANSETRON 4 MG/1
4 TABLET, FILM COATED ORAL
COMMUNITY
Start: 2018-03-19

## 2018-04-16 RX ORDER — SIMVASTATIN 10 MG/1
TABLET, FILM COATED ORAL
COMMUNITY
Start: 2018-03-12

## 2018-04-16 RX ORDER — FLUTICASONE FUROATE AND VILANTEROL 100; 25 UG/1; UG/1
POWDER RESPIRATORY (INHALATION)
COMMUNITY
Start: 2018-03-12

## 2018-12-10 ENCOUNTER — TELEPHONE (OUTPATIENT)
Dept: ENDOSCOPY | Facility: HOSPITAL | Age: 73
End: 2018-12-10

## 2018-12-10 NOTE — TELEPHONE ENCOUNTER
----- Message from Radha Chin sent at 12/10/2018  9:17 AM CST -----  Contact: pt#[phone]  Needs Advice    Reason for call:Pt is calling to schedule appt for abnormal cells         Communication Preference:call    Additional Information:

## 2018-12-13 ENCOUNTER — TELEPHONE (OUTPATIENT)
Dept: ENDOSCOPY | Facility: HOSPITAL | Age: 73
End: 2018-12-13

## 2018-12-13 DIAGNOSIS — K22.9 ESOPHAGEAL LESION: Primary | ICD-10-CM

## 2018-12-13 NOTE — TELEPHONE ENCOUNTER
----- Message from Cassy Cortes sent at 12/13/2018 11:30 AM CST -----  Contact: Self- [phone]  Victoriano- pt called to determine if path report was received yet- coming from Dr. Butts - please contact pt at [phone]

## 2018-12-17 ENCOUNTER — TELEPHONE (OUTPATIENT)
Dept: ENDOSCOPY | Facility: HOSPITAL | Age: 73
End: 2018-12-17

## 2018-12-17 NOTE — TELEPHONE ENCOUNTER
Spoke with patient. EGD scheduled for 12/28 at 9:30a. Reviewed prep instructions. Ms Woods verbalized understanding. She will get the pathology slides sent to us

## 2018-12-28 ENCOUNTER — ANESTHESIA (OUTPATIENT)
Dept: ENDOSCOPY | Facility: HOSPITAL | Age: 73
End: 2018-12-28
Payer: MEDICARE

## 2018-12-28 ENCOUNTER — HOSPITAL ENCOUNTER (OUTPATIENT)
Facility: HOSPITAL | Age: 73
Discharge: HOME | End: 2018-12-28
Attending: INTERNAL MEDICINE | Admitting: INTERNAL MEDICINE
Payer: MEDICARE

## 2018-12-28 ENCOUNTER — ANESTHESIA EVENT (OUTPATIENT)
Dept: ENDOSCOPY | Facility: HOSPITAL | Age: 73
End: 2018-12-28
Payer: MEDICARE

## 2018-12-28 VITALS
OXYGEN SATURATION: 99 % | DIASTOLIC BLOOD PRESSURE: 78 MMHG | HEIGHT: 63 IN | TEMPERATURE: 97 F | HEART RATE: 68 BPM | SYSTOLIC BLOOD PRESSURE: 150 MMHG | BODY MASS INDEX: 25.69 KG/M2 | WEIGHT: 145 LBS | RESPIRATION RATE: 15 BRPM

## 2018-12-28 DIAGNOSIS — K22.9 ESOPHAGEAL LESION: ICD-10-CM

## 2018-12-28 DIAGNOSIS — K31.9 LESION OF STOMACH: Primary | ICD-10-CM

## 2018-12-28 PROCEDURE — 63600175 PHARM REV CODE 636 W HCPCS: Performed by: NURSE ANESTHETIST, CERTIFIED REGISTERED

## 2018-12-28 PROCEDURE — 37000009 HC ANESTHESIA EA ADD 15 MINS: Performed by: INTERNAL MEDICINE

## 2018-12-28 PROCEDURE — C1886 CATHETER, ABLATION: HCPCS | Performed by: INTERNAL MEDICINE

## 2018-12-28 PROCEDURE — 88305 TISSUE SPECIMEN TO PATHOLOGY - SURGERY: ICD-10-PCS | Mod: 26,,, | Performed by: PATHOLOGY

## 2018-12-28 PROCEDURE — 88305 TISSUE EXAM BY PATHOLOGIST: CPT | Mod: 26,,, | Performed by: PATHOLOGY

## 2018-12-28 PROCEDURE — 43239 PR EGD, FLEX, W/BIOPSY, SGL/MULTI: ICD-10-PCS | Mod: 59,,, | Performed by: INTERNAL MEDICINE

## 2018-12-28 PROCEDURE — 43270 EGD LESION ABLATION: CPT | Performed by: INTERNAL MEDICINE

## 2018-12-28 PROCEDURE — 25000003 PHARM REV CODE 250: Performed by: INTERNAL MEDICINE

## 2018-12-28 PROCEDURE — D9220A PRA ANESTHESIA: Mod: CRNA,,, | Performed by: NURSE ANESTHETIST, CERTIFIED REGISTERED

## 2018-12-28 PROCEDURE — 88305 TISSUE EXAM BY PATHOLOGIST: CPT | Performed by: PATHOLOGY

## 2018-12-28 PROCEDURE — 43270 EGD LESION ABLATION: CPT | Mod: ,,, | Performed by: INTERNAL MEDICINE

## 2018-12-28 PROCEDURE — 37000008 HC ANESTHESIA 1ST 15 MINUTES: Performed by: INTERNAL MEDICINE

## 2018-12-28 PROCEDURE — 27201012 HC FORCEPS, HOT/COLD, DISP: Performed by: INTERNAL MEDICINE

## 2018-12-28 PROCEDURE — 43239 EGD BIOPSY SINGLE/MULTIPLE: CPT | Mod: 59,,, | Performed by: INTERNAL MEDICINE

## 2018-12-28 PROCEDURE — 43239 EGD BIOPSY SINGLE/MULTIPLE: CPT | Performed by: INTERNAL MEDICINE

## 2018-12-28 PROCEDURE — 43270 PR EGD, FLEX, W/ABLATION, TUMOR/POLYP/LESION(S), W/ DILATION: ICD-10-PCS | Mod: ,,, | Performed by: INTERNAL MEDICINE

## 2018-12-28 PROCEDURE — D9220A PRA ANESTHESIA: Mod: ANES,,, | Performed by: ANESTHESIOLOGY

## 2018-12-28 RX ORDER — SODIUM CHLORIDE 9 MG/ML
INJECTION, SOLUTION INTRAVENOUS CONTINUOUS
Status: ACTIVE | OUTPATIENT
Start: 2018-12-28

## 2018-12-28 RX ORDER — PROPOFOL 10 MG/ML
VIAL (ML) INTRAVENOUS
Status: DISCONTINUED | OUTPATIENT
Start: 2018-12-28 | End: 2018-12-28

## 2018-12-28 RX ORDER — SODIUM CHLORIDE 0.9 % (FLUSH) 0.9 %
3 SYRINGE (ML) INJECTION
Status: DISCONTINUED | OUTPATIENT
Start: 2018-12-28 | End: 2018-12-28 | Stop reason: HOSPADM

## 2018-12-28 RX ORDER — SODIUM CHLORIDE 9 MG/ML
INJECTION, SOLUTION INTRAVENOUS CONTINUOUS
Status: DISCONTINUED | OUTPATIENT
Start: 2018-12-28 | End: 2018-12-28 | Stop reason: HOSPADM

## 2018-12-28 RX ORDER — FENTANYL CITRATE 50 UG/ML
INJECTION, SOLUTION INTRAMUSCULAR; INTRAVENOUS
Status: DISCONTINUED | OUTPATIENT
Start: 2018-12-28 | End: 2018-12-28

## 2018-12-28 RX ORDER — SUCRALFATE 1 G/1
TABLET ORAL
Qty: 60 TABLET | Refills: 0 | Status: SHIPPED | OUTPATIENT
Start: 2018-12-28 | End: 2019-03-01

## 2018-12-28 RX ORDER — OMEPRAZOLE 40 MG/1
40 CAPSULE, DELAYED RELEASE ORAL
Qty: 60 CAPSULE | Refills: 2 | Status: SHIPPED | OUTPATIENT
Start: 2018-12-28 | End: 2020-07-08

## 2018-12-28 RX ORDER — LIDOCAINE HCL/PF 100 MG/5ML
SYRINGE (ML) INTRAVENOUS
Status: DISCONTINUED | OUTPATIENT
Start: 2018-12-28 | End: 2018-12-28

## 2018-12-28 RX ORDER — SODIUM CHLORIDE 0.9 % (FLUSH) 0.9 %
3 SYRINGE (ML) INJECTION
Status: ACTIVE | OUTPATIENT
Start: 2018-12-28

## 2018-12-28 RX ADMIN — LIDOCAINE HYDROCHLORIDE 100 MG: 20 INJECTION, SOLUTION INTRAVENOUS at 09:12

## 2018-12-28 RX ADMIN — PROPOFOL 30 MG: 10 INJECTION, EMULSION INTRAVENOUS at 09:12

## 2018-12-28 RX ADMIN — PROPOFOL 50 MG: 10 INJECTION, EMULSION INTRAVENOUS at 09:12

## 2018-12-28 RX ADMIN — SODIUM CHLORIDE: 0.9 INJECTION, SOLUTION INTRAVENOUS at 08:12

## 2018-12-28 RX ADMIN — FENTANYL CITRATE 50 MCG: 50 INJECTION, SOLUTION INTRAMUSCULAR; INTRAVENOUS at 08:12

## 2018-12-28 NOTE — TRANSFER OF CARE
"Anesthesia Transfer of Care Note    Patient: Kalia Woods    Procedure(s) Performed: Procedure(s) (LRB):  EGD (ESOPHAGOGASTRODUODENOSCOPY) (N/A)    Patient location: [facility]    Anesthesia Type: general    Transport from OR: Transported from OR on room air with adequate spontaneous ventilation    Post pain: adequate analgesia    Post assessment: tolerated procedure well and no apparent anesthetic complications    Post vital signs: stable    Level of consciousness: awake and oriented    Nausea/Vomiting: no nausea/vomiting    Complications: none    Transfer of care protocol was followed      Last vitals:   Visit Vitals  BP (!) 146/87 (BP Location: Left arm, Patient Position: Lying)   Pulse 79   Temp 36.5 °C (97.7 °F) (Oral)   Resp 16   Ht 5' 3" (1.6 m)   Wt 65.8 kg (145 lb)   SpO2 100%   Breastfeeding? No   BMI 25.69 kg/m²     "

## 2018-12-28 NOTE — ANESTHESIA PREPROCEDURE EVALUATION
12/28/2018  Kalia Woods is a 73 y.o., female with esophageal lesion here for EGD and possible intervention.    Pre-operative evaluation for Procedure(s) (LRB):  EGD (ESOPHAGOGASTRODUODENOSCOPY) (N/A)        Patient Active Problem List   Diagnosis    Hypertension    Elevated cholesterol    Thyroid disorder    Vitreous degeneration    Nonexudative senile macular degeneration of retina    Breast cancer, female    Airway hyperreactivity    Chronic kidney disease (CKD), stage III (moderate)    Degenerative joint disease involving multiple joints    HLD (hyperlipidemia)    Avitaminosis D    Epiretinal membrane    Bilateral dry eyes    Lesion of stomach    Gastric lesion    Esophageal lesion       Review of patient's allergies indicates:   Allergen Reactions    Aspirin      Other reaction(s): Shortness Of Breath    Hydrocodone-acetaminophen Anaphylaxis    Penicillins Other (See Comments)    Phenergan [promethazine] Other (See Comments)    Hydrocodone     Iodine and iodide containing products      Other reaction(s): Swelling    Iodine containing multivitamin     Letrozole Other (See Comments)     Depression, suicidal thoughts.    Umeclidinium-vilanterol      Rapid heart rate, tongue swelling, and leg cramps    Sulfa (sulfonamide antibiotics)      Other reaction(s): Rash       No current facility-administered medications on file prior to encounter.      Current Outpatient Medications on File Prior to Encounter   Medication Sig Dispense Refill    albuterol 90 mcg/actuation inhaler Inhale into the lungs.      benazepril (LOTENSIN) 20 MG tablet TAKE 1 TABLET EVERY DAY (NEED MD APPOINTMENT)      colchicine 0.6 mg tablet Take 0.6 mg by mouth.      diltiaZEM (CARDIZEM CD) 180 MG 24 hr capsule Take 180 mg by mouth.      ergocalciferol, vitamin D2, 2,000 unit Tab Take 1,000 Units by mouth.       fluticasone-vilanterol (BREO ELLIPTA) 100-25 mcg/dose diskus inhaler INHALE 1 PUFF INTO THE LUNGS ONE TIME DAILY.      furosemide (LASIX) 20 MG tablet       isosorbide mononitrate (IMDUR) 60 MG 24 hr tablet       levothyroxine (SYNTHROID) 100 MCG tablet Take 100 mcg by mouth.      omeprazole (PRILOSEC) 40 MG capsule Take by mouth.      potassium chloride SA (K-DUR,KLOR-CON) 10 MEQ tablet       simvastatin (ZOCOR) 10 MG tablet TAKE 1 TABLET EVERY NIGHT      zolpidem (AMBIEN) 10 mg Tab Take 1 tablet by mouth.      ciprofloxacin HCl (CIPRO) 500 MG tablet       nitroGLYCERIN (NITROSTAT) 0.4 MG SL tablet as needed.      ondansetron (ZOFRAN) 4 MG tablet Take 4 mg by mouth.      [DISCONTINUED] verapamil (CALAN) 120 MG tablet Take 0 tablets by mouth.         Past Surgical History:   Procedure Laterality Date    BLADDER SURGERY      BREAST SURGERY      CATARACT EXTRACTION W/  INTRAOCULAR LENS IMPLANT Left 04/20/2017    CATARACT EXTRACTION W/  INTRAOCULAR LENS IMPLANT Right 06/08/2017    CHOLECYSTECTOMY      COLONOSCOPY W/ POLYPECTOMY  12/03/2018    outside facility    ESOPHAGOGASTRODUODENOSCOPY  12/03/2018    outside facility    ESOPHAGOGASTRODUODENOSCOPY (EGD) N/A 1/31/2018    [facility]    ESOPHAGOGASTRODUODENOSCOPY (EGD) W/BX/POSSIBLE EMR/VLE N/A 11/22/2017    [facility] at [facility]    HYSTERECTOMY      LAPAROSCOPIC NISSEN FUNDOPLICATION      rectum tumor      TONSILLECTOMY         Social History     Socioeconomic History    Marital status:      Spouse name: Not on file    Number of children: Not on file    Years of education: Not on file    Highest education level: Not on file   Social Needs    Financial resource strain: Not on file    Food insecurity - worry: Not on file    Food insecurity - inability: Not on file    Transportation needs - medical: Not on file    Transportation needs - non-medical: Not on  file   Occupational History    Not on file   Tobacco Use    Smoking status: Never Smoker    Smokeless tobacco: Never Used   Substance and Sexual Activity    Alcohol use: No    Drug use: No    Sexual activity: Not on file   Other Topics Concern    Not on file   Social History Narrative    Not on file         CBC: No results for input(s): WBC, RBC, HGB, HCT, PLT, MCV, MCH, MCHC in the last 72 hours.    CMP: No results for input(s): NA, K, CL, CO2, BUN, CREATININE, GLU, MG, PHOS, CALCIUM, ALBUMIN, PROT, ALKPHOS, ALT, AST, BILITOT in the last 72 hours.    INR  No results for input(s): PT, INR, PROTIME, APTT in the last 72 hours.            2D Echo:  No results found for this or any previous visit.      Anesthesia Evaluation    I have reviewed the Patient Summary Reports.    I have reviewed the Nursing Notes.   I have reviewed the Medications.     Review of Systems  Anesthesia Hx:  No problems with previous Anesthesia    Hematology/Oncology:  Hematology Normal   Oncology Normal     EENT/Dental:EENT/Dental Normal   Cardiovascular:   Hypertension CAD   Angina Occasional angina none in last 3 months   Pulmonary:   Asthma    Renal/:   Chronic Renal Disease, CRI    Hepatic/GI:   GERD    Musculoskeletal:   Arthritis     Neurological:  Neurology Normal    Endocrine:  Endocrine Normal    Dermatological:  Skin Normal    Psych:  Psychiatric Normal           Physical Exam  General:  Well nourished    Airway/Jaw/Neck:  Airway Findings: Mouth Opening: Normal Tongue: Normal  General Airway Assessment: Adult  Mallampati: II  Jaw/Neck Findings:  Neck ROM: Normal ROM     Eyes/Ears/Nose:  Eyes/Ears/Nose Findings:    Dental:  Dental Findings: In tact   Chest/Lungs:  Chest/Lungs Findings: Clear to auscultation, Normal Respiratory Rate     Heart/Vascular:  Heart Findings: Rate: Normal  Rhythm: Regular Rhythm  Sounds: Normal  Heart Murmur  Vascular Findings:        Mental Status:  Mental Status Findings:  Cooperative, Alert and  Oriented         Anesthesia Plan  Type of Anesthesia, risks & benefits discussed:  Anesthesia Type:  general, MAC  Patient's Preference: General/MAC  Intra-op Monitoring Plan: standard ASA monitors  Intra-op Monitoring Plan Comments:   Post Op Pain Control Plan:   Post Op Pain Control Plan Comments: IV meds as needed  Induction:   IV  Beta Blocker:  Patient is not currently on a Beta-Blocker (No further documentation required).       Informed Consent: Patient understands risks and agrees with Anesthesia plan.  Questions answered. Anesthesia consent signed with patient.  ASA Score: 3     Day of Surgery Review of History & Physical:    H&P update referred to the provider.     Anesthesia Plan Notes: Discussed anesthetic options, pt understands and agrees with plan        Ready For Surgery From Anesthesia Perspective.

## 2018-12-28 NOTE — DISCHARGE INSTRUCTIONS

## 2018-12-28 NOTE — ANESTHESIA POSTPROCEDURE EVALUATION
"Anesthesia Post Evaluation    Patient: Kalia Woods    Procedure(s) Performed: Procedure(s) (LRB):  EGD (ESOPHAGOGASTRODUODENOSCOPY) (N/A)    Final Anesthesia Type: general  Patient location during evaluation: PACU  Patient participation: Yes- Able to Participate  Level of consciousness: awake and alert  Post-procedure vital signs: reviewed and stable  Pain management: adequate  Airway patency: patent  PONV status at discharge: No PONV  Anesthetic complications: no      Cardiovascular status: blood pressure returned to baseline  Respiratory status: unassisted  Hydration status: euvolemic  Follow-up not needed.        Visit Vitals  BP (!) 150/78   Pulse 68   Temp 36.1 °C (97 °F) (Temporal)   Resp 15   Ht 5' 3" (1.6 m)   Wt 65.8 kg (145 lb)   SpO2 99%   Breastfeeding? No   BMI 25.69 kg/m²       Pain/Evelyn Score: Evelyn Score: 10 (12/28/2018 10:10 AM)        "

## 2018-12-28 NOTE — H&P
History & Physical - Short Stay  Gastroenterology      SUBJECTIVE:     Procedure: EGD    Chief Complaint/Indication for Procedure: Abnormal gastric mucosa    History of Present Illness:  Patient is a 73 y.o. female presents with abnormal gastric cardia mucosa indeterminate for dysplasia.   PTA Medications   Medication Sig    albuterol 90 mcg/actuation inhaler Inhale into the lungs.    benazepril (LOTENSIN) 20 MG tablet TAKE 1 TABLET EVERY DAY (NEED MD APPOINTMENT)    ciprofloxacin HCl (CIPRO) 500 MG tablet     colchicine 0.6 mg tablet Take 0.6 mg by mouth.    diltiaZEM (CARDIZEM CD) 180 MG 24 hr capsule Take 180 mg by mouth.    ergocalciferol, vitamin D2, 2,000 unit Tab Take 1,000 Units by mouth.    fluticasone-vilanterol (BREO ELLIPTA) 100-25 mcg/dose diskus inhaler INHALE 1 PUFF INTO THE LUNGS ONE TIME DAILY.    furosemide (LASIX) 20 MG tablet     isosorbide mononitrate (IMDUR) 60 MG 24 hr tablet     levothyroxine (SYNTHROID) 100 MCG tablet Take 100 mcg by mouth.    nitroGLYCERIN (NITROSTAT) 0.4 MG SL tablet as needed.    omeprazole (PRILOSEC) 40 MG capsule Take by mouth.    ondansetron (ZOFRAN) 4 MG tablet Take 4 mg by mouth.    potassium chloride SA (K-DUR,KLOR-CON) 10 MEQ tablet     simvastatin (ZOCOR) 10 MG tablet TAKE 1 TABLET EVERY NIGHT    zolpidem (AMBIEN) 10 mg Tab Take 1 tablet by mouth.       Review of patient's allergies indicates:   Allergen Reactions    Aspirin      Other reaction(s): Shortness Of Breath    Hydrocodone-acetaminophen Anaphylaxis    Penicillins Other (See Comments)    Phenergan [promethazine] Other (See Comments)    Hydrocodone     Iodine and iodide containing products      Other reaction(s): Swelling    Iodine containing multivitamin     Letrozole Other (See Comments)     Depression, suicidal thoughts.    Umeclidinium-vilanterol      Rapid heart rate, tongue swelling, and leg cramps    Sulfa (sulfonamide antibiotics)      Other reaction(s): Rash        Past  Medical History:   Diagnosis Date    Asthma     Breast cancer     Cataract     Colon polyps     Degenerative joint disease involving multiple joints 8/19/2013    Diverticulosis of colon     Dysphagia     Elevated cholesterol     Gastritis     GERD (gastroesophageal reflux disease)     Hemorrhoids     Hypertension     Macular degeneration     Thyroid disorder      Past Surgical History:   Procedure Laterality Date    BLADDER SURGERY      BREAST SURGERY      CATARACT EXTRACTION W/  INTRAOCULAR LENS IMPLANT Left 04/20/2017    CATARACT EXTRACTION W/  INTRAOCULAR LENS IMPLANT Right 06/08/2017    CHOLECYSTECTOMY      COLONOSCOPY W/ POLYPECTOMY  12/03/2018    outside facility    ESOPHAGOGASTRODUODENOSCOPY  12/03/2018    outside facility    ESOPHAGOGASTRODUODENOSCOPY (EGD) N/A 1/31/2018    Performed by [facility] at [facility] ENDO (2ND FLR)    ESOPHAGOGASTRODUODENOSCOPY (EGD) W/BX/POSSIBLE EMR/VLE N/A 11/22/2017    Performed by [facility] at [facility] ENDO    HYSTERECTOMY      LAPAROSCOPIC NISSEN FUNDOPLICATION      rectum tumor      TONSILLECTOMY       Family History   Problem Relation Age of Onset    Diabetes Sister      Social History     Tobacco Use    Smoking status: Never Smoker    Smokeless tobacco: Never Used   Substance Use Topics    Alcohol use: No    Drug use: No       Review of Systems:  Constitutional: no fever or chills  Respiratory: positive for cough  Cardiovascular: no chest pain or palpitations  Gastrointestinal: no nausea or vomiting, no abdominal pain or change in bowel habits    OBJECTIVE:     Vital Signs (Most Recent)       Physical Exam:  General: well developed, well nourished  Lungs:  clear to auscultation bilaterally and normal respiratory effort  Heart: regular rate, S1, S2 normal  Abdomen: soft, non-tender non-distented; bowel sounds normal; no masses,  no organomegaly    Laboratory  CBC: No results for input(s): WBC, RBC, HGB, HCT, PLT, MCV, MCH, MCHC  in the last 168 hours.  CMP: No results for input(s): GLU, CALCIUM, ALBUMIN, PROT, NA, K, CO2, CL, BUN, CREATININE, ALKPHOS, ALT, AST, BILITOT in the last 168 hours.  Coagulation: No results for input(s): LABPROT, INR, APTT in the last 168 hours.      Diagnostic Results:      ASSESSMENT/PLAN:     Gastric cardia with biopsies showing changes indeterminate for dysplasia.    Plan: EGD    Anesthesia Plan: MAC    ASA Grade: ASA 2 - Patient with mild systemic disease with no functional limitations      The impression and plan was discussed in detail with the patient and family. All questions have been answered and the patient voices understanding of our plan at this point. The risk of the procedure was discussed in detail which includes but not limited to bleeding, infection, perforation in some cases requiring surgery with its spectrum of complications.

## 2018-12-28 NOTE — DISCHARGE SUMMARY
Discharge Summary/Instructions after an Endoscopic Procedure    Patient Name: Kalia Woods  Patient MRN: [id number]  Patient YOB: 1945 Friday, December 28, 2018  Ravinder Pastrana MD    RESTRICTIONS:  During your procedure today, you received medications for sedation.  These medications may affect your judgment, balance and coordination.  Therefore, for 24 hours, you have the following restrictions:     - DO NOT drive a car, operate machinery, make legal/financial decisions, sign important papers or drink alcohol.      ACTIVITY:  Today: no heavy lifting, straining or running due to procedural sedation/anesthesia.  The following day: return to full activity including work.    DIET:  Eat and drink normally unless instructed otherwise.     TREATMENT FOR COMMON SIDE EFFECTS:  - Mild abdominal pain, nausea, belching, bloating or excessive gas:  rest, eat lightly and use a heating pad.  - Sore Throat: treat with throat lozenges and/or gargle with warm salt water.  - Because air was used during the procedure, expelling large amounts of air from your rectum or belching is normal.  - If a bowel prep was taken, you may not have a bowel movement for 1-3 days.  This is normal.      SYMPTOMS TO WATCH FOR AND REPORT TO YOUR PHYSICIAN:  1. Abdominal pain or bloating, other than gas cramps.  2. Chest pain.  3. Back pain.  4. Signs of infection such as: chills or fever occurring within 24 hours after the procedure.  5. Rectal bleeding, which would show as bright red, maroon, or black stools. (A tablespoon of blood from the rectum is not serious, especially if hemorrhoids are present.)  6. Vomiting.  7. Weakness or dizziness.      GO DIRECTLY TO THE NEAREST EMERGENCY ROOM IF YOU HAVE ANY OF THE FOLLOWING:     Difficulty breathing              Chills and/or fever over 101 F   Persistent vomiting and/or vomiting blood   Severe abdominal pain   Severe chest pain   Black, tarry stools   Bleeding- more than one  tablespoon   Any other symptom or condition that you feel may need urgent attention    Your doctor recommends these additional instructions:  If any biopsies were taken, your doctors clinic will contact you in 1 to 2 weeks with any results.    - Discharge patient to home (ambulatory).   - Resume previous diet.   - Repeat upper endoscopy in 12 weeks for follow-up of Salazar's ablation.   - Use Prilosec (omeprazole) 40 mg PO BID.   - Use sucralfate suspension 1 gram PO BID.    For questions, problems or results please call your physician - Ravinder Pastrana MD at Work:  [phone].    [facility], EMERGENCY ROOM PHONE NUMBER: [phone]    IF A COMPLICATION OR EMERGENCY SITUATION ARISES AND YOU ARE UNABLE TO REACH YOUR PHYSICIAN - GO DIRECTLY TO THE EMERGENCY ROOM.

## 2018-12-28 NOTE — [employer] PATIENT INSTRUCTIONS
Discharge Summary/Instructions after an Endoscopic Procedure  Patient Name: Kalia Woods  Patient MRN: [id number]  Patient YOB: 1945 Friday, December 28, 2018  Ravinder Pastrana MD  RESTRICTIONS:  During your procedure today, you received medications for sedation.  These   medications may affect your judgment, balance and coordination.  Therefore,   for 24 hours, you have the following restrictions:   - DO NOT drive a car, operate machinery, make legal/financial decisions,   sign important papers or drink alcohol.    ACTIVITY:  Today: no heavy lifting, straining or running due to procedural   sedation/anesthesia.  The following day: return to full activity including work.  DIET:  Eat and drink normally unless instructed otherwise.     TREATMENT FOR COMMON SIDE EFFECTS:  - Mild abdominal pain, nausea, belching, bloating or excessive gas:  rest,   eat lightly and use a heating pad.  - Sore Throat: treat with throat lozenges and/or gargle with warm salt   water.  - Because air was used during the procedure, expelling large amounts of air   from your rectum or belching is normal.  - If a bowel prep was taken, you may not have a bowel movement for 1-3 days.    This is normal.  SYMPTOMS TO WATCH FOR AND REPORT TO YOUR PHYSICIAN:  1. Abdominal pain or bloating, other than gas cramps.  2. Chest pain.  3. Back pain.  4. Signs of infection such as: chills or fever occurring within 24 hours   after the procedure.  5. Rectal bleeding, which would show as bright red, maroon, or black stools.   (A tablespoon of blood from the rectum is not serious, especially if   hemorrhoids are present.)  6. Vomiting.  7. Weakness or dizziness.  GO DIRECTLY TO THE NEAREST EMERGENCY ROOM IF YOU HAVE ANY OF THE FOLLOWING:      Difficulty breathing              Chills and/or fever over 101 F   Persistent vomiting and/or vomiting blood   Severe abdominal pain   Severe chest pain   Black, tarry stools   Bleeding- more than one  tablespoon   Any other symptom or condition that you feel may need urgent attention  Your doctor recommends these additional instructions:  If any biopsies were taken, your doctors clinic will contact you in 1 to 2   weeks with any results.  - Discharge patient to home (ambulatory).   - Resume previous diet.   - Repeat upper endoscopy in 12 weeks for follow-up of Salazar's ablation.   - Use Prilosec (omeprazole) 40 mg PO BID.   - Use sucralfate suspension 1 gram PO BID.  For questions, problems or results please call your physician - Ravinder Pastrana MD at Work:  [phone].  [facility], EMERGENCY ROOM PHONE NUMBER: [phone]  IF A COMPLICATION OR EMERGENCY SITUATION ARISES AND YOU ARE UNABLE TO REACH   YOUR PHYSICIAN - GO DIRECTLY TO THE EMERGENCY ROOM.  Ravinder Pastrana MD  12/28/2018 9:18:27 AM  This report has been verified and signed electronically.  [employer]

## 2019-01-08 ENCOUNTER — TELEPHONE (OUTPATIENT)
Dept: ENDOSCOPY | Facility: HOSPITAL | Age: 74
End: 2019-01-08

## 2019-01-08 NOTE — TELEPHONE ENCOUNTER
----- Message from Ravinder Pastrana MD sent at 1/7/2019  1:22 PM CST -----  Please let the patient know the biopsies showed inflammation. No dysplasia.

## 2019-01-29 ENCOUNTER — TELEPHONE (OUTPATIENT)
Dept: ENDOSCOPY | Facility: HOSPITAL | Age: 74
End: 2019-01-29

## 2019-01-29 DIAGNOSIS — K31.9 GASTRIC LESION: Primary | ICD-10-CM

## 2019-01-30 NOTE — TELEPHONE ENCOUNTER
Message   Received: Today   Message Contents   MD Jennifer Guerrero MA   Caller: Unspecified (Yesterday,  9:29 PM)             EGD in [place] or [place], this is a possible [facility].   Ravinder Pastrana MD

## 2019-02-12 ENCOUNTER — OFFICE VISIT (OUTPATIENT)
Dept: OPHTHALMOLOGY | Facility: CLINIC | Age: 74
End: 2019-02-12
Payer: MEDICARE

## 2019-02-12 ENCOUNTER — TELEPHONE (OUTPATIENT)
Dept: ENDOSCOPY | Facility: HOSPITAL | Age: 74
End: 2019-02-12

## 2019-02-12 DIAGNOSIS — H20.9 IRITIS OF BOTH EYES: Primary | ICD-10-CM

## 2019-02-12 DIAGNOSIS — Z96.1 PSEUDOPHAKIA OF BOTH EYES: ICD-10-CM

## 2019-02-12 PROCEDURE — 99999 PR PBB SHADOW E&M-EST. PATIENT-LVL I: CPT | Mod: PBBFAC,,, | Performed by: OPHTHALMOLOGY

## 2019-02-12 PROCEDURE — 92012 INTRM OPH EXAM EST PATIENT: CPT | Mod: S$GLB,,, | Performed by: OPHTHALMOLOGY

## 2019-02-12 PROCEDURE — 99999 PR PBB SHADOW E&M-EST. PATIENT-LVL I: ICD-10-PCS | Mod: PBBFAC,,, | Performed by: OPHTHALMOLOGY

## 2019-02-12 PROCEDURE — 92012 PR EYE EXAM, EST PATIENT,INTERMED: ICD-10-PCS | Mod: S$GLB,,, | Performed by: OPHTHALMOLOGY

## 2019-02-12 RX ORDER — PREDNISOLONE ACETATE 10 MG/ML
1 SUSPENSION/ DROPS OPHTHALMIC 2 TIMES DAILY
Qty: 10 ML | Refills: 1 | Status: SHIPPED | OUTPATIENT
Start: 2019-02-12 | End: 2019-02-13 | Stop reason: SDUPTHER

## 2019-02-12 NOTE — TELEPHONE ENCOUNTER
Spoke with patient. EGD scheduled for 4/1 at 11a. Reviewed prep instructions. Ms Woods verbalized understanding.

## 2019-02-12 NOTE — PROGRESS NOTES
"HPI     Eye Pain      Additional comments: Pressure & Discomfort, RX Check              Comments     Patient States Blurred Vision & Pressure Behind Both Eyes. Sometimes has   shooting pains and eyes feel sore     LAST SAW [facility] 12/29/15- REFERRED BY [facility] IN 2015  AA SMD  PCIOL OS SN60WF+22.5 / 04/21/17 CDE 12.69  PCIOL OD +22.0 SN60WF / CDE: 10.25 / 6/8/17  PVD, floaters  No holes or tears, looks good  Dry "AA" SMD              Last edited by Zach Kearney MD on 2/12/2019  4:11 PM. (History)            Assessment /Plan     For exam results, see Encounter Report.      ICD-10-CM ICD-9-CM    1. Iritis of both eyes H20.9 364.3 Slight inflammation OU today. Will begin pred acetate BID OU   2. Pseudophakia of both eyes Z96.1 V43.1        Add pred acetate bid OU  Return to clinic 3 weeks with iritis check and IOP check               "

## 2019-02-13 DIAGNOSIS — H20.9 IRITIS OF BOTH EYES: ICD-10-CM

## 2019-02-13 RX ORDER — PREDNISOLONE ACETATE 10 MG/ML
1 SUSPENSION/ DROPS OPHTHALMIC 2 TIMES DAILY
Qty: 10 ML | Refills: 1 | Status: SHIPPED | OUTPATIENT
Start: 2019-02-13 | End: 2019-03-01 | Stop reason: SDUPTHER

## 2019-02-13 RX ORDER — PREDNISOLONE ACETATE 10 MG/ML
1 SUSPENSION/ DROPS OPHTHALMIC 2 TIMES DAILY
Qty: 10 ML | Refills: 1 | Status: SHIPPED | OUTPATIENT
Start: 2019-02-13 | End: 2019-04-22 | Stop reason: ALTCHOICE

## 2019-02-13 NOTE — TELEPHONE ENCOUNTER
----- Message from Peri Reich sent at 2/13/2019 12:41 PM CST -----  Contact: pt  Pt states she was seen on yesterday, 02/12 by Dr. Kearney. Pt was prescribed eye drops, but eye drops are not available at pharmacy. Pt would like to have an alternative called in as long as medication is a generic. Eye drops are on back order. Please call pt back at [phone].       Pt uses:   [place] [facility] [place]  [address] [address]   [place] [address]   Phone: [phone] Fax: [phone]

## 2019-03-01 ENCOUNTER — OFFICE VISIT (OUTPATIENT)
Dept: OPHTHALMOLOGY | Facility: CLINIC | Age: 74
End: 2019-03-01
Payer: MEDICARE

## 2019-03-01 DIAGNOSIS — Z96.1 PSEUDOPHAKIA OF BOTH EYES: ICD-10-CM

## 2019-03-01 DIAGNOSIS — H20.9 IRITIS OF BOTH EYES: Primary | ICD-10-CM

## 2019-03-01 PROCEDURE — 99999 PR PBB SHADOW E&M-EST. PATIENT-LVL II: CPT | Mod: PBBFAC,,, | Performed by: OPHTHALMOLOGY

## 2019-03-01 PROCEDURE — 99999 PR PBB SHADOW E&M-EST. PATIENT-LVL II: ICD-10-PCS | Mod: PBBFAC,,, | Performed by: OPHTHALMOLOGY

## 2019-03-01 PROCEDURE — 92012 PR EYE EXAM, EST PATIENT,INTERMED: ICD-10-PCS | Mod: S$GLB,,, | Performed by: OPHTHALMOLOGY

## 2019-03-01 PROCEDURE — 92012 INTRM OPH EXAM EST PATIENT: CPT | Mod: S$GLB,,, | Performed by: OPHTHALMOLOGY

## 2019-03-01 NOTE — PROGRESS NOTES
"HPI     Eye Problem      Additional comments: iRITIS CHECK              Comments     Patient returns for a 3 week iritis check, patient states light   sensitivity is better.    AA SMD  PCIOL OS SN60WF+22.5 / 04/21/17 CDE 12.69  PCIOL OD +22.0 SN60WF / CDE: 10.25 / 6/8/17  PVD, floaters  No holes or tears, looks good  Dry "AA" SMD    OU Pred Acet Bid          Last edited by Zach Kearney MD on 3/1/2019  9:12 AM. (History)            Assessment /Plan     For exam results, see Encounter Report.      ICD-10-CM ICD-9-CM    1. Iritis of both eyes H20.9 364.3 Resolved. Reduce pred acetate to qd    2. Pseudophakia of both eyes Z96.1 V43.1      OU Pred Acet qd for 1 month then d/c     Return to clinic as scheduled with JCC or PRN               "

## 2019-03-13 ENCOUNTER — TELEPHONE (OUTPATIENT)
Dept: ENDOSCOPY | Facility: HOSPITAL | Age: 74
End: 2019-03-13

## 2019-04-01 ENCOUNTER — HOSPITAL ENCOUNTER (OUTPATIENT)
Facility: HOSPITAL | Age: 74
Discharge: HOME | End: 2019-04-01
Attending: INTERNAL MEDICINE | Admitting: INTERNAL MEDICINE
Payer: MEDICARE

## 2019-04-01 ENCOUNTER — ANESTHESIA EVENT (OUTPATIENT)
Dept: ENDOSCOPY | Facility: HOSPITAL | Age: 74
End: 2019-04-01
Payer: MEDICARE

## 2019-04-01 ENCOUNTER — ANESTHESIA (OUTPATIENT)
Dept: ENDOSCOPY | Facility: HOSPITAL | Age: 74
End: 2019-04-01
Payer: MEDICARE

## 2019-04-01 VITALS
HEART RATE: 67 BPM | HEIGHT: 63 IN | SYSTOLIC BLOOD PRESSURE: 141 MMHG | TEMPERATURE: 98 F | OXYGEN SATURATION: 97 % | DIASTOLIC BLOOD PRESSURE: 83 MMHG | WEIGHT: 150 LBS | BODY MASS INDEX: 26.58 KG/M2 | RESPIRATION RATE: 18 BRPM

## 2019-04-01 DIAGNOSIS — K22.9 ESOPHAGEAL LESION: Primary | ICD-10-CM

## 2019-04-01 DIAGNOSIS — K22.719 BARRETT'S ESOPHAGUS WITH DYSPLASIA: ICD-10-CM

## 2019-04-01 PROCEDURE — 88305 TISSUE SPECIMEN TO PATHOLOGY - SURGERY: ICD-10-PCS | Mod: 26,,, | Performed by: PATHOLOGY

## 2019-04-01 PROCEDURE — 27201012 HC FORCEPS, HOT/COLD, DISP: Performed by: INTERNAL MEDICINE

## 2019-04-01 PROCEDURE — 37000008 HC ANESTHESIA 1ST 15 MINUTES: Performed by: INTERNAL MEDICINE

## 2019-04-01 PROCEDURE — D9220A PRA ANESTHESIA: ICD-10-PCS | Mod: ANES,,, | Performed by: ANESTHESIOLOGY

## 2019-04-01 PROCEDURE — 88305 TISSUE EXAM BY PATHOLOGIST: CPT | Mod: 26,,, | Performed by: PATHOLOGY

## 2019-04-01 PROCEDURE — 63600175 PHARM REV CODE 636 W HCPCS: Performed by: NURSE ANESTHETIST, CERTIFIED REGISTERED

## 2019-04-01 PROCEDURE — D9220A PRA ANESTHESIA: ICD-10-PCS | Mod: CRNA,,, | Performed by: NURSE ANESTHETIST, CERTIFIED REGISTERED

## 2019-04-01 PROCEDURE — 43239 EGD BIOPSY SINGLE/MULTIPLE: CPT | Mod: ,,, | Performed by: INTERNAL MEDICINE

## 2019-04-01 PROCEDURE — D9220A PRA ANESTHESIA: Mod: CRNA,,, | Performed by: NURSE ANESTHETIST, CERTIFIED REGISTERED

## 2019-04-01 PROCEDURE — 43239 PR EGD, FLEX, W/BIOPSY, SGL/MULTI: ICD-10-PCS | Mod: ,,, | Performed by: INTERNAL MEDICINE

## 2019-04-01 PROCEDURE — 43239 EGD BIOPSY SINGLE/MULTIPLE: CPT | Performed by: INTERNAL MEDICINE

## 2019-04-01 PROCEDURE — D9220A PRA ANESTHESIA: Mod: ANES,,, | Performed by: ANESTHESIOLOGY

## 2019-04-01 PROCEDURE — 88305 TISSUE EXAM BY PATHOLOGIST: CPT | Performed by: PATHOLOGY

## 2019-04-01 PROCEDURE — 37000009 HC ANESTHESIA EA ADD 15 MINS: Performed by: INTERNAL MEDICINE

## 2019-04-01 RX ORDER — DIPHENHYDRAMINE HYDROCHLORIDE 50 MG/ML
25 INJECTION INTRAMUSCULAR; INTRAVENOUS EVERY 6 HOURS PRN
Status: DISCONTINUED | OUTPATIENT
Start: 2019-04-01 | End: 2019-04-01 | Stop reason: HOSPADM

## 2019-04-01 RX ORDER — SODIUM CHLORIDE 0.9 % (FLUSH) 0.9 %
10 SYRINGE (ML) INJECTION
Status: DISCONTINUED | OUTPATIENT
Start: 2019-04-01 | End: 2019-04-01 | Stop reason: HOSPADM

## 2019-04-01 RX ORDER — LIDOCAINE HCL/PF 100 MG/5ML
SYRINGE (ML) INTRAVENOUS
Status: DISCONTINUED | OUTPATIENT
Start: 2019-04-01 | End: 2019-04-01

## 2019-04-01 RX ORDER — PROPOFOL 10 MG/ML
VIAL (ML) INTRAVENOUS
Status: DISCONTINUED | OUTPATIENT
Start: 2019-04-01 | End: 2019-04-01

## 2019-04-01 RX ORDER — SODIUM CHLORIDE 9 MG/ML
INJECTION, SOLUTION INTRAVENOUS CONTINUOUS
Status: DISCONTINUED | OUTPATIENT
Start: 2019-04-01 | End: 2019-04-01 | Stop reason: HOSPADM

## 2019-04-01 RX ORDER — FENTANYL CITRATE 50 UG/ML
INJECTION, SOLUTION INTRAMUSCULAR; INTRAVENOUS
Status: DISCONTINUED | OUTPATIENT
Start: 2019-04-01 | End: 2019-04-01

## 2019-04-01 RX ORDER — PROPOFOL 10 MG/ML
VIAL (ML) INTRAVENOUS CONTINUOUS PRN
Status: DISCONTINUED | OUTPATIENT
Start: 2019-04-01 | End: 2019-04-01

## 2019-04-01 RX ORDER — FENTANYL CITRATE 50 UG/ML
25 INJECTION, SOLUTION INTRAMUSCULAR; INTRAVENOUS EVERY 5 MIN PRN
Status: DISCONTINUED | OUTPATIENT
Start: 2019-04-01 | End: 2019-04-01 | Stop reason: HOSPADM

## 2019-04-01 RX ORDER — HYDROMORPHONE HYDROCHLORIDE 1 MG/ML
0.2 INJECTION, SOLUTION INTRAMUSCULAR; INTRAVENOUS; SUBCUTANEOUS EVERY 5 MIN PRN
Status: DISCONTINUED | OUTPATIENT
Start: 2019-04-01 | End: 2019-04-01 | Stop reason: HOSPADM

## 2019-04-01 RX ADMIN — PROPOFOL 80 MG: 10 INJECTION, EMULSION INTRAVENOUS at 11:04

## 2019-04-01 RX ADMIN — LIDOCAINE HYDROCHLORIDE 50 MG: 20 INJECTION, SOLUTION INTRAVENOUS at 11:04

## 2019-04-01 RX ADMIN — PROPOFOL 150 MCG/KG/MIN: 10 INJECTION, EMULSION INTRAVENOUS at 11:04

## 2019-04-01 RX ADMIN — FENTANYL CITRATE 50 MCG: 50 INJECTION, SOLUTION INTRAMUSCULAR; INTRAVENOUS at 11:04

## 2019-04-01 NOTE — ANESTHESIA POSTPROCEDURE EVALUATION
Anesthesia Post Evaluation    Patient: Kalia Woods    Procedure(s) Performed: Procedure(s) (LRB):  EGD (ESOPHAGOGASTRODUODENOSCOPY) (N/A)    Final Anesthesia Type: general  Patient location during evaluation: PACU  Patient participation: Yes- Able to Participate  Level of consciousness: awake and alert  Post-procedure vital signs: reviewed and stable  Pain management: adequate  Airway patency: patent  PONV status at discharge: No PONV  Anesthetic complications: no      Cardiovascular status: hemodynamically stable  Respiratory status: unassisted  Hydration status: euvolemic  Follow-up not needed.          Vitals Value Taken Time   /83 4/1/2019  1:02 PM   Temp 36.6 °C (97.9 °F) 4/1/2019  1:00 PM   Pulse 70 4/1/2019  1:03 PM   Resp 18 4/1/2019  1:00 PM   SpO2 97 % 4/1/2019  1:04 PM   Vitals shown include unvalidated device data.      Event Time     Out of Recovery 11:59:32          Pain/Evelyn Score: Evelyn Score: 10 (4/1/2019  1:08 PM)

## 2019-04-01 NOTE — PROVATION PATIENT INSTRUCTIONS
Discharge Summary/Instructions after an Endoscopic Procedure  Patient Name: Kalia Woods  Patient MRN: [id number]  Patient YOB: 1945 Monday, April 01, 2019  Ravinder Pastrana MD  RESTRICTIONS:  During your procedure today, you received medications for sedation.  These   medications may affect your judgment, balance and coordination.  Therefore,   for 24 hours, you have the following restrictions:   - DO NOT drive a car, operate machinery, make legal/financial decisions,   sign important papers or drink alcohol.    ACTIVITY:  Today: no heavy lifting, straining or running due to procedural   sedation/anesthesia.  The following day: return to full activity including work.  DIET:  Eat and drink normally unless instructed otherwise.     TREATMENT FOR COMMON SIDE EFFECTS:  - Mild abdominal pain, nausea, belching, bloating or excessive gas:  rest,   eat lightly and use a heating pad.  - Sore Throat: treat with throat lozenges and/or gargle with warm salt   water.  - Because air was used during the procedure, expelling large amounts of air   from your rectum or belching is normal.  - If a bowel prep was taken, you may not have a bowel movement for 1-3 days.    This is normal.  SYMPTOMS TO WATCH FOR AND REPORT TO YOUR PHYSICIAN:  1. Abdominal pain or bloating, other than gas cramps.  2. Chest pain.  3. Back pain.  4. Signs of infection such as: chills or fever occurring within 24 hours   after the procedure.  5. Rectal bleeding, which would show as bright red, maroon, or black stools.   (A tablespoon of blood from the rectum is not serious, especially if   hemorrhoids are present.)  6. Vomiting.  7. Weakness or dizziness.  GO DIRECTLY TO THE NEAREST EMERGENCY ROOM IF YOU HAVE ANY OF THE FOLLOWING:      Difficulty breathing              Chills and/or fever over 101 F   Persistent vomiting and/or vomiting blood   Severe abdominal pain   Severe chest pain   Black, tarry stools   Bleeding- more than one  tablespoon   Any other symptom or condition that you feel may need urgent attention  Your doctor recommends these additional instructions:  If any biopsies were taken, your doctors clinic will contact you in 1 to 2   weeks with any results.  - Discharge patient to home (ambulatory).   - Await pathology results.   - Repeat upper endoscopy in 1 year for surveillance based on pathology   results.  For questions, problems or results please call your physician - Ravinder Pastrana MD at Work:  [phone].  [facility], EMERGENCY ROOM PHONE NUMBER: [phone]  IF A COMPLICATION OR EMERGENCY SITUATION ARISES AND YOU ARE UNABLE TO REACH   YOUR PHYSICIAN - GO DIRECTLY TO THE EMERGENCY ROOM.  Ravinder Pastrana MD  4/1/2019 11:30:32 AM  This report has been verified and signed electronically.  [employer]

## 2019-04-01 NOTE — ANESTHESIA PREPROCEDURE EVALUATION
04/01/2019  Kalia Woods is a 74 y.o., female.  Patient Active Problem List   Diagnosis    Hypertension    Elevated cholesterol    Thyroid disorder    Vitreous degeneration    Nonexudative senile macular degeneration of retina    Breast cancer, female    Airway hyperreactivity    Chronic kidney disease (CKD), stage III (moderate)    Degenerative joint disease involving multiple joints    HLD (hyperlipidemia)    Avitaminosis D    Epiretinal membrane    Bilateral dry eyes    Lesion of stomach    Gastric lesion    Esophageal lesion    Salazar's esophagus with dysplasia         Anesthesia Evaluation         Review of Systems      Physical Exam  General:  Well nourished    Airway/Jaw/Neck:  Airway Findings: Mouth Opening: Normal Tongue: Normal  General Airway Assessment: Adult  Mallampati: II  Improves to II with phonation.  TM Distance: Normal, at least 6 cm      Dental:  Dental Findings: In tact   Chest/Lungs:  Chest/Lungs Findings: Clear to auscultation     Heart/Vascular:  Heart Findings: Rate: Normal  Rhythm: Regular Rhythm  Sounds: Normal        Mental Status:  Mental Status Findings:  Cooperative, Alert and Oriented         Anesthesia Plan  Type of Anesthesia, risks & benefits discussed:  Anesthesia Type:  general  Patient's Preference: General  Intra-op Monitoring Plan: standard ASA monitors  Intra-op Monitoring Plan Comments: Standard ASA monitors.   Post Op Pain Control Plan: per primary service following discharge from PACU  Post Op Pain Control Plan Comments: Per primary service.     Induction:   IV  Beta Blocker:  Patient is not currently on a Beta-Blocker (No further documentation required).       Informed Consent: Patient understands risks and agrees with Anesthesia plan.  Questions answered. Anesthesia consent signed with patient.  ASA Score: 3     Day of Surgery Review of  History & Physical:    H&P update referred to the surgeon.     Anesthesia Plan Notes: Chart reviewed, patient interviewed and examined.  The plan for general anesthesia was explained.  Questions were answered and the consent was signed.  Tc STANLEY         Ready For Surgery From Anesthesia Perspective.

## 2019-04-01 NOTE — TRANSFER OF CARE
"Anesthesia Transfer of Care Note    Patient: Kalia Woods    Procedure(s) Performed: Procedure(s) (LRB):  EGD (ESOPHAGOGASTRODUODENOSCOPY) (N/A)    Patient location: PACU    Anesthesia Type: general    Transport from OR: Transported from OR on room air with adequate spontaneous ventilation    Post pain: adequate analgesia    Post assessment: no apparent anesthetic complications    Post vital signs: stable    Level of consciousness: awake    Nausea/Vomiting: no nausea/vomiting    Complications: none    Transfer of care protocol was followed      Last vitals:   Visit Vitals  /68   Pulse 66   Temp 36.6 °C (97.9 °F) (Temporal)   Resp 11   Ht 5' 3" (1.6 m)   Wt 68 kg (150 lb)   SpO2 97%   Breastfeeding? No   BMI 26.57 kg/m²     "

## 2019-04-01 NOTE — DISCHARGE INSTRUCTIONS
Upper GI Endoscopy     During endoscopy, a long, flexible tube is used to view the inside of your upper GI tract.      Upper GI endoscopy allows your healthcare provider to look directly into the beginning of your gastrointestinal (GI) tract. The esophagus, stomach, and duodenum (the first part of the small intestine) make up the upper GI tract.   Before the exam  Follow these and any other instructions you are given before your endoscopy. If you dont follow the healthcare providers instructions carefully, the test may need to be canceled or done over:  · Don't eat or drink anything after midnight the night before your exam. If your exam is in the afternoon, drink only clear liquids in the morning. Don't eat or drink anything for 8 hours before the exam. In some cases, you may be able to take medicines with sips of water until 2 hours before the procedure. Speak with your healthcare provider about this.   · Bring your X-rays and any other test results you have.  · Because you will be sedated, arrange for an adult to drive you home after the exam.  · Tell your healthcare provider before the exam if you are taking any medicines or have any medical problems.  The procedure  Here is what to expect:  · You will lie on the endoscopy table. Usually patients lie on the left side.  · You will be monitored and given oxygen.  · Your throat may be numbed with a spray or gargle. You are given medicine through an intravenous (IV) line that will help you relax and remain comfortable. You may be awake or asleep during the procedure.  · The healthcare provider will put the endoscope in your mouth and down your esophagus. It is thinner than most pieces of food that you swallow. It will not affect your breathing. The medicine helps keep you from gagging.  · Air is put into your GI tract to expand it. It can make you burp.  · During the procedure, the healthcare provider can take biopsies (tissue samples), remove abnormalities,  such as polyps, or treat abnormalities through a variety of devices placed through the endoscope. You will not feel this.   · The endoscope carries images of your upper GI tract to a video screen. If you are awake, you may be able to look at the images.  · After the procedure is done, you will rest for a time. An adult must drive you home.  When to call your healthcare provider  Contact your healthcare provider if you have:  · Black or tarry stools, or blood in your stool  · Fever  · Pain in your belly that does not go away  · Nausea and vomiting, or vomiting blood   Date Last Reviewed: 7/1/2016 © 2000-2017 [employer]. [address] [place] [address], [place] [address]. All rights reserved. This information is not intended as a substitute for professional medical care. Always follow your healthcare professional's instructions.        Anesthesia: General Anesthesia     You are watched continuously during your procedure by your anesthesia provider.     Youre due to have surgery. During surgery, youll be given medicine called anesthesia or anesthetic. This will keep you comfortable and pain-free. Your anesthesia provider will use general anesthesia.  What is general anesthesia?  General anesthesia puts you into a state like deep sleep. It goes into the bloodstream (IV anesthetics), into the lungs (gas anesthetics), or both. You feel nothing during the procedure. You will not remember it. During the procedure, the anesthesia provider monitors you continuously. He or she checks your heart rate and rhythm, blood pressure, breathing, and blood oxygen.  · IV anesthetics. IV anesthetics are given through an IV line in your arm. Theyre often given first. This is so you are asleep before a gas anesthetic is started. Some kinds of IV anesthetics relieve pain. Others relax you. Your doctor will decide which kind is best in your case.  · Gas anesthetics. Gas anesthetics are breathed into the lungs. They are often used  to keep you asleep. They can be given through a facemask or a tube placed in your larynx or trachea (breathing tube).  ¨ If you have a facemask, your anesthesia provider will most likely place it over your nose and mouth while youre still awake. Youll breathe oxygen through the mask as your IV anesthetic is started. Gas anesthetic may be added through the mask.  ¨ If you have a tube in the larynx or trachea, it will be inserted into your throat after youre asleep.  Anesthesia tools and medicines  You will likely have:  · IV anesthetics. These are put into an IV line into your bloodstream.  · Gas anesthetics. You breathe these anesthetics into your lungs, where they pass into your bloodstream.  · Pulse oximeter. This is a small clip that is attached to the end of your finger. This measures your blood oxygen level.  · Electrocardiography leads (electrodes). These are small sticky pads that are placed on your chest. They record your heart rate and rhythm.  · Blood pressure cuff. This reads your blood pressure.  Risks and possible complications  General anesthesia has some risks. These include:  · Breathing problems  · Nausea and vomiting  · Sore throat or hoarseness (usually temporary)  · Allergic reaction to the anesthetic  · Irregular heartbeat (rare)  · Cardiac arrest (rare)   Anesthesia safety  · Follow all instructions you are given for how long not to eat or drink before your procedure.  · Be sure your doctor knows what medicines and drugs you take. This includes over-the-counter medicines, herbs, supplements, alcohol or other drugs. You will be asked when those were last taken.  · Have an adult family member or friend drive you home after the procedure.  · For the first 24 hours after your surgery:  ¨ Do not drive or use heavy equipment.  ¨ Do not make important decisions or sign legal documents. If important decisions or signing legal documents is necessary during the first 24 hours after surgery, have a  trusted family member or spouse act on your behalf.  ¨ Avoid alcohol.  ¨ Have a responsible adult stay with you. He or she can watch for problems and help keep you safe.  Date Last Reviewed: 12/1/2016  © 4066-4005 [employer]. [address] [place] [address], [place] [address]. All rights reserved. This information is not intended as a substitute for professional medical care. Always follow your healthcare professional's instructions.

## 2019-04-01 NOTE — DISCHARGE SUMMARY
Discharge Summary/Instructions after an Endoscopic Procedure    Patient Name: Kalia Woods  Patient MRN: [id number]  Patient YOB: 1945 Monday, April 01, 2019  Ravinder Pastrana MD    RESTRICTIONS:  During your procedure today, you received medications for sedation.  These medications may affect your judgment, balance and coordination.  Therefore, for 24 hours, you have the following restrictions:     - DO NOT drive a car, operate machinery, make legal/financial decisions, sign important papers or drink alcohol.      ACTIVITY:  Today: no heavy lifting, straining or running due to procedural sedation/anesthesia.  The following day: return to full activity including work.    DIET:  Eat and drink normally unless instructed otherwise.     TREATMENT FOR COMMON SIDE EFFECTS:  - Mild abdominal pain, nausea, belching, bloating or excessive gas:  rest, eat lightly and use a heating pad.  - Sore Throat: treat with throat lozenges and/or gargle with warm salt water.  - Because air was used during the procedure, expelling large amounts of air from your rectum or belching is normal.  - If a bowel prep was taken, you may not have a bowel movement for 1-3 days.  This is normal.      SYMPTOMS TO WATCH FOR AND REPORT TO YOUR PHYSICIAN:  1. Abdominal pain or bloating, other than gas cramps.  2. Chest pain.  3. Back pain.  4. Signs of infection such as: chills or fever occurring within 24 hours after the procedure.  5. Rectal bleeding, which would show as bright red, maroon, or black stools. (A tablespoon of blood from the rectum is not serious, especially if hemorrhoids are present.)  6. Vomiting.  7. Weakness or dizziness.      GO DIRECTLY TO THE NEAREST EMERGENCY ROOM IF YOU HAVE ANY OF THE FOLLOWING:     Difficulty breathing              Chills and/or fever over 101 F   Persistent vomiting and/or vomiting blood   Severe abdominal pain   Severe chest pain   Black, tarry stools   Bleeding- more than one tablespoon   Any  other symptom or condition that you feel may need urgent attention    Your doctor recommends these additional instructions:  If any biopsies were taken, your doctors clinic will contact you in 1 to 2 weeks with any results.    - Discharge patient to home (ambulatory).   - Await pathology results.   - Repeat upper endoscopy in 1 year for surveillance based on pathology results.    For questions, problems or results please call your physician - Ravinder Pastrana MD at Work:  [phone].    [facility], EMERGENCY ROOM PHONE NUMBER: [phone]    IF A COMPLICATION OR EMERGENCY SITUATION ARISES AND YOU ARE UNABLE TO REACH YOUR PHYSICIAN - GO DIRECTLY TO THE EMERGENCY ROOM.

## 2019-04-01 NOTE — H&P
History & Physical - Short Stay  Gastroenterology      SUBJECTIVE:     Procedure: EGD    Chief Complaint/Indication for Procedure: Abnormal mucosa  History of Present Illness:  Patient is a 74 y.o. female presents with gastric cardia mucosa indeterminate for dysplasia S/P RFA here for follow up.    PTA Medications   Medication Sig    benazepril (LOTENSIN) 20 MG tablet TAKE 1 TABLET EVERY DAY (NEED MD APPOINTMENT)    ciprofloxacin HCl (CIPRO) 500 MG tablet     colchicine 0.6 mg tablet Take 0.6 mg by mouth.    diltiaZEM (CARDIZEM CD) 180 MG 24 hr capsule Take 180 mg by mouth.    ergocalciferol, vitamin D2, 2,000 unit Tab Take 1,000 Units by mouth.    fluticasone-vilanterol (BREO ELLIPTA) 100-25 mcg/dose diskus inhaler INHALE 1 PUFF INTO THE LUNGS ONE TIME DAILY.    furosemide (LASIX) 20 MG tablet     isosorbide mononitrate (IMDUR) 60 MG 24 hr tablet     levothyroxine (SYNTHROID) 100 MCG tablet Take 100 mcg by mouth.    nitroGLYCERIN (NITROSTAT) 0.4 MG SL tablet as needed.    omeprazole (PRILOSEC) 40 MG capsule Take 1 capsule (40 mg total) by mouth 2 (two) times daily before meals.    ondansetron (ZOFRAN) 4 MG tablet Take 4 mg by mouth.    potassium chloride SA (K-DUR,KLOR-CON) 10 MEQ tablet     prednisoLONE acetate (PRED FORTE) 1 % DrpS Place 1 drop into both eyes 2 (two) times daily.    simvastatin (ZOCOR) 10 MG tablet TAKE 1 TABLET EVERY NIGHT    zolpidem (AMBIEN) 10 mg Tab Take 1 tablet by mouth.       Review of patient's allergies indicates:   Allergen Reactions    Aspirin      Other reaction(s): Shortness Of Breath    Hydrocodone-acetaminophen Anaphylaxis    Penicillins Other (See Comments)    Phenergan [promethazine] Other (See Comments)    Hydrocodone     Iodine and iodide containing products      Other reaction(s): Swelling    Iodine containing multivitamin     Letrozole Other (See Comments)     Depression, suicidal thoughts.    Umeclidinium-vilanterol      Rapid heart rate, tongue  swelling, and leg cramps    Sulfa (sulfonamide antibiotics)      Other reaction(s): Rash        Past Medical History:   Diagnosis Date    Asthma     Breast cancer     Cataract     Colon polyps     Degenerative joint disease involving multiple joints 8/19/2013    Diverticulosis of colon     Dysphagia     Elevated cholesterol     Esophageal lesion     Gastritis     GERD (gastroesophageal reflux disease)     Hemorrhoids     Hypertension     Macular degeneration     Thyroid disorder      Past Surgical History:   Procedure Laterality Date    BLADDER SURGERY      BREAST SURGERY      CATARACT EXTRACTION W/  INTRAOCULAR LENS IMPLANT Left 04/20/2017    CATARACT EXTRACTION W/  INTRAOCULAR LENS IMPLANT Right 06/08/2017    CHOLECYSTECTOMY      COLONOSCOPY W/ POLYPECTOMY  12/03/2018    outside facility    EGD (ESOPHAGOGASTRODUODENOSCOPY) N/A 12/28/2018    [facility] at [facility] (2ND FLR)    ESOPHAGOGASTRODUODENOSCOPY  12/03/2018    outside facility    ESOPHAGOGASTRODUODENOSCOPY (EGD) N/A 1/31/2018    [facility] at [facility] (2ND FLR)    ESOPHAGOGASTRODUODENOSCOPY (EGD) W/BX/POSSIBLE EMR/VLE N/A 11/22/2017    [facility]    HYSTERECTOMY      LAPAROSCOPIC NISSEN FUNDOPLICATION      rectum tumor      TONSILLECTOMY       Family History   Problem Relation Age of Onset    Diabetes Sister      Social History     Tobacco Use    Smoking status: Never Smoker    Smokeless tobacco: Never Used   Substance Use Topics    Alcohol use: No    Drug use: No       Review of Systems:  Constitutional: no fever or chills  Respiratory: positive for cough  Cardiovascular: no chest pain or palpitations  Gastrointestinal: no nausea or vomiting, no abdominal pain or change in bowel habits    OBJECTIVE:     Vital Signs (Most Recent)       Physical Exam:  General: well developed, well nourished  Lungs:  clear to auscultation bilaterally and  normal respiratory effort  Heart: regular rate, S1, S2 normal  Abdomen: soft, non-tender non-distented; bowel sounds normal; no masses,  no organomegaly    Laboratory  CBC: No results for input(s): WBC, RBC, HGB, HCT, PLT, MCV, MCH, MCHC in the last 168 hours.  CMP: No results for input(s): GLU, CALCIUM, ALBUMIN, PROT, NA, K, CO2, CL, BUN, CREATININE, ALKPHOS, ALT, AST, BILITOT in the last 168 hours.  Coagulation: No results for input(s): LABPROT, INR, APTT in the last 168 hours.      Diagnostic Results:      ASSESSMENT/PLAN:         Plan: EGD    Anesthesia Plan: MAC    ASA Grade: ASA 3 - Patient with moderate systemic disease with functional limitations     The impression and plan was discussed in detail with the patient and family. All questions have been answered and the patient voices understanding of our plan at this point. The risk of the procedure was discussed in detail which includes but not limited to bleeding, infection, perforation in some cases requiring surgery with its spectrum of complications.

## 2019-04-01 NOTE — PLAN OF CARE
This writer spoke to endo and asked them to tell Dr. Pastrana to come speak with the patient. She is awake and her family at the bedside.

## 2019-04-09 ENCOUNTER — TELEPHONE (OUTPATIENT)
Dept: ENDOSCOPY | Facility: HOSPITAL | Age: 74
End: 2019-04-09

## 2019-04-09 NOTE — TELEPHONE ENCOUNTER
----- Message from Ravinder Pastrana MD sent at 4/9/2019 10:07 AM CDT -----  Please let the patient know the biopsies of the GE junction was negative for Salazar's or dysplasia. EGD in 1 year.

## 2019-04-22 ENCOUNTER — OFFICE VISIT (OUTPATIENT)
Dept: OPHTHALMOLOGY | Facility: CLINIC | Age: 74
End: 2019-04-22
Payer: MEDICARE

## 2019-04-22 DIAGNOSIS — H35.3134 ADVANCED ATROPHIC NONEXUDATIVE AGE-RELATED MACULAR DEGENERATION OF BOTH EYES WITH SUBFOVEAL INVOLVEMENT: Primary | ICD-10-CM

## 2019-04-22 DIAGNOSIS — H35.372 EPIRETINAL MEMBRANE (ERM) OF LEFT EYE: ICD-10-CM

## 2019-04-22 PROCEDURE — 92134 CPTRZ OPH DX IMG PST SGM RTA: CPT | Mod: S$GLB,,, | Performed by: OPHTHALMOLOGY

## 2019-04-22 PROCEDURE — 99999 PR PBB SHADOW E&M-EST. PATIENT-LVL III: ICD-10-PCS | Mod: PBBFAC,,, | Performed by: OPHTHALMOLOGY

## 2019-04-22 PROCEDURE — 92134 POSTERIOR SEGMENT OCT RETINA (OCULAR COHERENCE TOMOGRAPHY)-BOTH EYES: ICD-10-PCS | Mod: S$GLB,,, | Performed by: OPHTHALMOLOGY

## 2019-04-22 PROCEDURE — 92014 PR EYE EXAM, EST PATIENT,COMPREHESV: ICD-10-PCS | Mod: S$GLB,,, | Performed by: OPHTHALMOLOGY

## 2019-04-22 PROCEDURE — 99999 PR PBB SHADOW E&M-EST. PATIENT-LVL III: CPT | Mod: PBBFAC,,, | Performed by: OPHTHALMOLOGY

## 2019-04-22 PROCEDURE — 92014 COMPRE OPH EXAM EST PT 1/>: CPT | Mod: S$GLB,,, | Performed by: OPHTHALMOLOGY

## 2019-04-22 NOTE — PROGRESS NOTES
===============================  04/22/2019   Kalia Woods,   74 y.o. female   Last visit [facility]: :4/16/2018   Last visit eye dept. 3/1/2019  [facility]:  Corrected distance visual acuity was 20/30 in the right eye and 20/30 in the left eye.  Tonometry     Tonometry (Applanation, 10:34 AM)       Right Left    Pressure 14 14              Wearing Rx     Wearing Rx       Sphere Cylinder Axis Add    Right -0.75 +0.50 065 +2.50    Left -1.00 +0.50 112 +2.50              Manifest Refraction     Manifest Refraction       Sphere Cylinder Axis Dist [facility] Add    Right -0.25 +0.50 065 20/25 +2.50    Left -1.00 +0.50 112 20/20 +2.50              Chief Complaint   Patient presents with    Macular Degeneration     here for 1 yr fabiano armenta, pt states reading is a little difficult     Ophthalmic Medications     Ophthalmic - Anti-inflammatory, Glucocorticoids Start End     prednisoLONE acetate (PRED FORTE) 1 % DrpS (Discontinued)    2/13/2019 4/22/2019    Sig: Place 1 drop into both eyes 2 (two) times daily.    Route: Both Eyes    Reason for Discontinue: Therapy completed         HPI     Macular Degeneration      Additional comments: here for 1 yr fabiano armenta, pt states reading is   a little difficult              Comments     SMD  PCIOL OS SN60WF+22.5 / 04/21/17 CDE 12.69  PCIOL OD +22.0 SN60WF / CDE: 10.25 / 6/8/17  PVD, floaters  Bilateral iritis  OU Pred Acet qd          Last edited by KENDAL Esquivel MD on 4/22/2019 10:55 AM. (History)          ________________  4/22/2019  Problem List Items Addressed This Visit        Eye/Vision problems    Advanced atrophic nonexudative age-related macular degeneration of both eyes with subfoveal involvement - Primary    Relevant Orders    Posterior Segment OCT Retina-Both eyes (Completed)    Epiretinal membrane    Relevant Orders    Posterior Segment OCT Retina-Both eyes (Completed)          .worse since last year, follow every 3-4 months  recommend daily david, AREDS II vitamins  rtc 12  weeks to recheck  Instructed to call with any worsening       ===========================

## 2019-07-22 ENCOUNTER — OFFICE VISIT (OUTPATIENT)
Dept: OPHTHALMOLOGY | Facility: CLINIC | Age: 74
End: 2019-07-22
Payer: MEDICARE

## 2019-07-22 DIAGNOSIS — H35.3134 ADVANCED ATROPHIC NONEXUDATIVE AGE-RELATED MACULAR DEGENERATION OF BOTH EYES WITH SUBFOVEAL INVOLVEMENT: Primary | ICD-10-CM

## 2019-07-22 PROCEDURE — 92134 POSTERIOR SEGMENT OCT RETINA (OCULAR COHERENCE TOMOGRAPHY)-BOTH EYES: ICD-10-PCS | Mod: S$GLB,,, | Performed by: OPHTHALMOLOGY

## 2019-07-22 PROCEDURE — 99999 PR PBB SHADOW E&M-EST. PATIENT-LVL II: ICD-10-PCS | Mod: PBBFAC,,, | Performed by: OPHTHALMOLOGY

## 2019-07-22 PROCEDURE — 92134 CPTRZ OPH DX IMG PST SGM RTA: CPT | Mod: S$GLB,,, | Performed by: OPHTHALMOLOGY

## 2019-07-22 PROCEDURE — 99999 PR PBB SHADOW E&M-EST. PATIENT-LVL II: CPT | Mod: PBBFAC,,, | Performed by: OPHTHALMOLOGY

## 2019-07-22 PROCEDURE — 92012 PR EYE EXAM, EST PATIENT,INTERMED: ICD-10-PCS | Mod: S$GLB,,, | Performed by: OPHTHALMOLOGY

## 2019-07-22 PROCEDURE — 92012 INTRM OPH EXAM EST PATIENT: CPT | Mod: S$GLB,,, | Performed by: OPHTHALMOLOGY

## 2019-07-22 NOTE — PROGRESS NOTES
===============================  Kalia Woods,   74 y.o. female   Last visit [facility]: :4/22/2019   Last visit eye dept. 4/22/2019  [facility]:  Corrected distance visual acuity was 20/25 in the right eye and 20/25 in the left eye.   Not recorded         Not recorded         Not recorded         Not recorded        Chief Complaint   Patient presents with    Macular Degeneration     following smd ou           ________________  7/22/2019  HPI     Macular Degeneration      Additional comments: following smd ou               Comments     ADVANCED SMD/ FOLLOWING Q 12 WEEKS  PCIOL OS SN60WF+22.5 / 04/21/17 CDE 12.69  PCIOL OD +22.0 SN60WF / CDE: 10.25 / 6/8/17  PVD  Bilateral iritis  OU Pred Acet qd          Last edited by HENRY Pedro on 7/22/2019 10:59 AM. (History)      Problem List Items Addressed This Visit        Eye/Vision problems    Advanced atrophic nonexudative age-related macular degeneration of both eyes with subfoveal involvement - Primary    Relevant Orders    Posterior Segment OCT Retina-Both eyes (Completed)          .  Stable  continue areds II  continue daily amsler  Instructed to call me right away with any worsening     ===========================

## 2019-08-23 ENCOUNTER — OFFICE VISIT (OUTPATIENT)
Dept: RHEUMATOLOGY | Facility: CLINIC | Age: 74
End: 2019-08-23
Payer: MEDICARE

## 2019-08-23 VITALS
DIASTOLIC BLOOD PRESSURE: 79 MMHG | HEIGHT: 63 IN | BODY MASS INDEX: 30.16 KG/M2 | WEIGHT: 170.19 LBS | HEART RATE: 88 BPM | SYSTOLIC BLOOD PRESSURE: 136 MMHG

## 2019-08-23 DIAGNOSIS — M70.61 TROCHANTERIC BURSITIS OF BOTH HIPS: Primary | ICD-10-CM

## 2019-08-23 DIAGNOSIS — M70.62 TROCHANTERIC BURSITIS OF BOTH HIPS: Primary | ICD-10-CM

## 2019-08-23 DIAGNOSIS — M25.552 CHRONIC HIP PAIN, BILATERAL: ICD-10-CM

## 2019-08-23 DIAGNOSIS — M25.551 CHRONIC HIP PAIN, BILATERAL: ICD-10-CM

## 2019-08-23 DIAGNOSIS — G89.29 CHRONIC HIP PAIN, BILATERAL: ICD-10-CM

## 2019-08-23 PROCEDURE — 3078F DIAST BP <80 MM HG: CPT | Mod: CPTII,S$GLB,, | Performed by: INTERNAL MEDICINE

## 2019-08-23 PROCEDURE — 3075F SYST BP GE 130 - 139MM HG: CPT | Mod: CPTII,S$GLB,, | Performed by: INTERNAL MEDICINE

## 2019-08-23 PROCEDURE — 1101F PT FALLS ASSESS-DOCD LE1/YR: CPT | Mod: CPTII,S$GLB,, | Performed by: INTERNAL MEDICINE

## 2019-08-23 PROCEDURE — 20610 DRAIN/INJ JOINT/BURSA W/O US: CPT | Mod: 50,S$GLB,, | Performed by: INTERNAL MEDICINE

## 2019-08-23 PROCEDURE — 99999 PR PBB SHADOW E&M-EST. PATIENT-LVL III: CPT | Mod: PBBFAC,,, | Performed by: INTERNAL MEDICINE

## 2019-08-23 PROCEDURE — 99999 PR PBB SHADOW E&M-EST. PATIENT-LVL III: ICD-10-PCS | Mod: PBBFAC,,, | Performed by: INTERNAL MEDICINE

## 2019-08-23 PROCEDURE — 3075F PR MOST RECENT SYSTOLIC BLOOD PRESS GE 130-139MM HG: ICD-10-PCS | Mod: CPTII,S$GLB,, | Performed by: INTERNAL MEDICINE

## 2019-08-23 PROCEDURE — 1101F PR PT FALLS ASSESS DOC 0-1 FALLS W/OUT INJ PAST YR: ICD-10-PCS | Mod: CPTII,S$GLB,, | Performed by: INTERNAL MEDICINE

## 2019-08-23 PROCEDURE — 20610 LARGE JOINT ASPIRATION/INJECTION: R GREATER TROCHANTERIC BURSA, L GREATER TROCHANTERIC BURSA: ICD-10-PCS | Mod: 50,S$GLB,, | Performed by: INTERNAL MEDICINE

## 2019-08-23 PROCEDURE — 3078F PR MOST RECENT DIASTOLIC BLOOD PRESSURE < 80 MM HG: ICD-10-PCS | Mod: CPTII,S$GLB,, | Performed by: INTERNAL MEDICINE

## 2019-08-23 PROCEDURE — 99204 PR OFFICE/OUTPT VISIT, NEW, LEVL IV, 45-59 MIN: ICD-10-PCS | Mod: 25,S$GLB,, | Performed by: INTERNAL MEDICINE

## 2019-08-23 PROCEDURE — 99204 OFFICE O/P NEW MOD 45 MIN: CPT | Mod: 25,S$GLB,, | Performed by: INTERNAL MEDICINE

## 2019-08-23 RX ORDER — TRIAMCINOLONE ACETONIDE 40 MG/ML
40 INJECTION, SUSPENSION INTRA-ARTICULAR; INTRAMUSCULAR
Status: DISCONTINUED | OUTPATIENT
Start: 2019-08-23 | End: 2019-08-23 | Stop reason: HOSPADM

## 2019-08-23 RX ADMIN — TRIAMCINOLONE ACETONIDE 40 MG: 40 INJECTION, SUSPENSION INTRA-ARTICULAR; INTRAMUSCULAR at 02:08

## 2019-08-23 NOTE — ASSESSMENT & PLAN NOTE
Trochanteric bursitis of bilateral hips likely secondary to degenerative disease.  Failed physical therapy.  No improvement with medications over-the-counter.  Inject Kenalog.

## 2019-08-23 NOTE — PROGRESS NOTES
RHEUMATOLOGY CLINIC INITIAL VISIT  Chief complaints:-  My hips hurt.    HPI:-  Kalia Mota a 74 y.o. pleasant female comes in for an initial visit with above chief complaints.  She complains of chronic, achy, insidious onset, progressive in nature pain around bilateral hips.  She has tried physical therapy, over-the-counter medications and application of ice with no significant improvement.  The pain is worst when she lays down on them at night.  Pain is not associated with morning stiffness.  No recent injury.  She denies any pain in her groin.  No lower back pain.  No sciatica.  No pain over small joints of hands or feet.    Review of Systems   Constitutional: Negative for chills and fever.   HENT: Negative for congestion and sore throat.    Eyes: Negative for blurred vision and redness.   Respiratory: Negative for cough and shortness of breath.    Cardiovascular: Negative for chest pain and leg swelling.   Gastrointestinal: Negative for abdominal pain.   Genitourinary: Negative for dysuria.   Musculoskeletal: Positive for joint pain. Negative for back pain, falls, myalgias and neck pain.   Skin: Negative for rash.   Neurological: Negative for headaches.   Endo/Heme/Allergies: Does not bruise/bleed easily.   Psychiatric/Behavioral: Negative for memory loss. The patient does not have insomnia.        Past Medical History:   Diagnosis Date    Asthma     Salazar esophagus     Breast cancer     Cataract     Colon polyps     Degenerative joint disease involving multiple joints 8/19/2013    Diverticulosis of colon     Dysphagia     Elevated cholesterol     Esophageal lesion     Gastritis     GERD (gastroesophageal reflux disease)     Hemorrhoids     Hypertension     Macular degeneration     MI (myocardial infarction)     Thyroid disorder        Past Surgical History:   Procedure Laterality Date    BLADDER SURGERY      BREAST SURGERY      CATARACT EXTRACTION W/  INTRAOCULAR LENS IMPLANT Left  "04/20/2017    CATARACT EXTRACTION W/  INTRAOCULAR LENS IMPLANT Right 06/08/2017    CHOLECYSTECTOMY      COLONOSCOPY W/ POLYPECTOMY  12/03/2018    outside facility    EGD (ESOPHAGOGASTRODUODENOSCOPY) N/A 4/1/2019    Performed by [facility]ana [facility] at [facility] ENDO (2ND FLR)    EGD (ESOPHAGOGASTRODUODENOSCOPY) N/A 12/28/2018    Performed by Ravinder [facility]ana [facility] at [facility] ENDO (2ND FLR)    ESOPHAGOGASTRODUODENOSCOPY  12/03/2018    outside facility    ESOPHAGOGASTRODUODENOSCOPY (EGD) N/A 1/31/2018    Performed by Ravinder [facility]ana MD at [facility] ENDO (2ND FLR)    ESOPHAGOGASTRODUODENOSCOPY (EGD) W/BX/POSSIBLE EMR/VLE N/A 11/22/2017    [facility] at [facility]    HYSTERECTOMY      LAPAROSCOPIC NISSEN FUNDOPLICATION      rectum tumor      TONSILLECTOMY          Social History     Tobacco Use    Smoking status: Never Smoker    Smokeless tobacco: Never Used   Substance Use Topics    Alcohol use: No    Drug use: No       Family History   Problem Relation Age of Onset    Diabetes Sister        Review of patient's allergies indicates:   Allergen Reactions    Aspirin      Other reaction(s): Shortness Of Breath    Hydrocodone-acetaminophen Anaphylaxis    Penicillins Other (See Comments)    Phenergan [promethazine] Other (See Comments)    Hydrocodone     Iodine and iodide containing products      Other reaction(s): Swelling    Iodine containing multivitamin     Letrozole Other (See Comments)     Depression, suicidal thoughts.    Umeclidinium-vilanterol      Rapid heart rate, tongue swelling, and leg cramps    Sulfa (sulfonamide antibiotics)      Other reaction(s): Rash       Vitals:    08/23/19 1405   BP: 136/79   Pulse: 88   Weight: 77.2 kg (170 lb 3.1 oz)   Height: 5' 3" (1.6 m)   PainSc:   4       Physical Exam   Constitutional: She is oriented to person, place, and time and well-developed, well-nourished, and in no distress. No distress.   HENT:   Head: Normocephalic. "   Mouth/Throat: Oropharynx is clear and moist.   Eyes: Pupils are equal, round, and reactive to light. Conjunctivae and EOM are normal.   Neck: Normal range of motion. Neck supple.   Cardiovascular: Normal rate and intact distal pulses.   Pulmonary/Chest: Effort normal. No respiratory distress.   Abdominal: Soft. There is no tenderness.   Musculoskeletal:   No synovitis over small joints of hands or feet.  No effusion over large joints.  Severe tenderness over bilateral trochanteric bursa.   Neurological: She is alert and oriented to person, place, and time. No cranial nerve deficit.   Skin: Skin is warm. No rash noted. No erythema.   Psychiatric: Mood and affect normal.   Nursing note and vitals reviewed.        Medication List with Changes/Refills   Current Medications    BENAZEPRIL (LOTENSIN) 20 MG TABLET    TAKE 1 TABLET EVERY DAY (NEED MD APPOINTMENT)    COLCHICINE 0.6 MG TABLET    Take 0.6 mg by mouth.    DILTIAZEM (CARDIZEM CD) 180 MG 24 HR CAPSULE    Take 180 mg by mouth.    ERGOCALCIFEROL, VITAMIN D2, 2,000 UNIT TAB    Take 1,000 Units by mouth.    FLUTICASONE-VILANTEROL (BREO ELLIPTA) 100-25 MCG/DOSE DISKUS INHALER    INHALE 1 PUFF INTO THE LUNGS ONE TIME DAILY.    FUROSEMIDE (LASIX) 20 MG TABLET        ISOSORBIDE MONONITRATE (IMDUR) 60 MG 24 HR TABLET        LEVOTHYROXINE (SYNTHROID) 100 MCG TABLET    Take 100 mcg by mouth.    NITROGLYCERIN (NITROSTAT) 0.4 MG SL TABLET    as needed.    OMEPRAZOLE (PRILOSEC) 40 MG CAPSULE    Take 1 capsule (40 mg total) by mouth 2 (two) times daily before meals.    ONDANSETRON (ZOFRAN) 4 MG TABLET    Take 4 mg by mouth.    POTASSIUM CHLORIDE SA (K-DUR,KLOR-CON) 10 MEQ TABLET        SIMVASTATIN (ZOCOR) 10 MG TABLET    TAKE 1 TABLET EVERY NIGHT    ZOLPIDEM (AMBIEN) 10 MG TAB    Take 1 tablet by mouth.       Assessment/Plans:-  1. Trochanteric bursitis of both hips    2. Chronic hip pain, bilateral      Problem List Items Addressed This Visit        Orthopedic    Trochanteric  bursitis of both hips - Primary    Current Assessment & Plan     Trochanteric bursitis of bilateral hips likely secondary to degenerative disease.  Failed physical therapy.  No improvement with medications over-the-counter.  Inject Kenalog.         Relevant Orders    Large Joint Aspiration/Injection: R greater trochanteric bursa, L greater trochanteric bursa      Other Visit Diagnoses     Chronic hip pain, bilateral        Relevant Orders    Large Joint Aspiration/Injection: R greater trochanteric bursa, L greater trochanteric bursa        Large Joint Aspiration/Injection: R greater trochanteric bursa, L greater trochanteric bursa  Date/Time: 8/23/2019 2:37 PM  Performed by: Harsha Horton MD  Authorized by: Harsha Horton MD     Consent Done?:  Yes (Verbal)  Indications:  Pain  Procedure site marked: Yes    Timeout: Prior to procedure the correct patient, procedure, and site was verified    Anesthesia  Local anesthesia used  Anesthesia: local infiltration  Anesthetic: lidocaine 2% without epinephrine  Anesthetic total: 2mL    Location:  Hip  Site:  R greater trochanteric bursa and L greater trochanteric bursa  Prep: Patient was prepped and draped in usual sterile fashion    Needle size:  25 G  Ultrasonic Guidance for needle placement: No  Approach:  Lateral  Medications:  40 mg triamcinolone acetonide 40 mg/mL; 40 mg triamcinolone acetonide 40 mg/mL  Patient tolerance:  Patient tolerated the procedure well with no immediate complications        Follow up if symptoms worsen or fail to improve.    Disclaimer: This note was prepared using voice recognition system and is likely to have sound alike errors and is not proof read.  Please call me with any questions.

## 2019-11-14 NOTE — PLAN OF CARE
Dr. Pastrana is at the bedside.   Past Medical History:   Diagnosis Date    Coronary artery disease 2007    MI   1 c. 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    atorvastatin (LIPITOR) 40 MG tablet     clobetasol (TEMOVATE) 0.05 % external solution AAA scalp QHS PRN flare    clopidogrel (PLAVIX) 75 mg tablet Take 75 mg by mouth once daily.      lisinopril (PRINIVIL,ZESTRIL) 20 MG tablet Take 20 mg by mouth once daily.    metoprolol succinate (TOPROL-XL) 25 MG 24 hr tablet Take 25 mg by mouth once daily.      oxyCODONE-acetaminophen (PERCOCET) 5-325 mg per tablet Take 1 tablet by mouth every 6 (six) hours as needed (pain not relieved by otc agents).    triamcinolone acetonide 0.1% (KENALOG) 0.1 % cream AAA left cheek bid     No current facility-administered medications for this visit.      Facility-Administered Medications Ordered in Other Visits   Medication    diphenhydrAMINE injection 25 mg    fentaNYL injection 25 mcg    hydromorphone (PF) injection 0.2 mg    lactated ringers infusion    lactated ringers infusion    lidocaine (PF) 10 mg/ml (1%) injection 10 mg    ondansetron injection 4 mg    oxyCODONE immediate release tablet 5 mg    promethazine (PHENERGAN) 6.25 mg in dextrose 5 % 50 mL IVPB    sodium chloride 0.9% flush 3 mL    sodium chloride 0.9% flush 3 mL        Review of patient's allergies indicates:  No Known Allergies    Family History   Problem Relation Age of Onset    No Known Problems Mother     Melanoma Neg Hx     Psoriasis Neg Hx     Lupus Neg Hx     Eczema Neg Hx        Social History     Socioeconomic History    Marital status:      Spouse name: Not on file    Number of children: Not on file    Years of education: Not on file    Highest education level: Not on file   Occupational History    Not on file   Social Needs    Financial resource strain: Not on file    Food insecurity:     Worry: Not on file     Inability: Not on file    Transportation needs:     Medical: Not on file     Non-medical: Not on file   Tobacco Use    Smoking status: Current Every Day Smoker    Smokeless tobacco: Never Used   Substance and Sexual Activity    Alcohol use: Yes     Comment: social    Drug use: Never    Sexual activity: Not on file   Lifestyle    Physical activity:     Days per week: Not on file     Minutes per session: Not on file    Stress: Not on file   Relationships    Social connections:     Talks on phone: Not on file     Gets together: Not on file     Attends [employer] service: Not on file     Active member of club or organization: Not on file     Attends meetings of clubs or organizations: Not on file     Relationship status: Not on file   Other Topics Concern    Not on file   Social History Narrative    Not on file       Chief Complaint:   Chief Complaint   Patient presents with    Right Foot - Pain, Injury       Consulting Physician: Self, Aaareferral    History of present illness:    This is a 68 y.o. year old male who complains of right foot pain since stepping out of his truck 2 weeks ago.  He puts his pain at a 5/10 with weight-bearing pressure.  He did not have this pain prior.    Review of Systems:    Constitution: Denies chills, fever, and sweats.  HENT: Denies headaches or blurry vision.  Cardiovascular: Denies chest pain or  "irregular heart beat.  Respiratory: Denies cough or shortness of breath.  Gastrointestinal: Denies abdominal pain, nausea, or vomiting.  Musculoskeletal:  Denies muscle cramps.  Neurological: Denies dizziness or focal weakness.  Psychiatric/Behavioral: Normal mental status.  Hematologic/Lymphatic: Denies bleeding problem or easy bruising/bleeding.  Skin: Denies rash or suspicious lesions.    Examination:    Vital Signs:    Vitals:    11/11/19 0952   BP: (!) 165/89   Pulse: 83   Weight: 80 kg (176 lb 5.9 oz)   Height: 5' 9" (1.753 m)   PainSc:   5   PainLoc: Foot       Body mass index is 26.05 kg/m².    This a well-developed, well nourished patient in no acute distress.    Alert and oriented x 3 and cooperative to examination.       Physical Exam: Right Foot Exam     Gait:   Mildly antalgic    Skin  Rash:   None  Scars:   None    Inspection   Deformity:   None  Erythema:   None  Bruising:   None  Swelling:   Mild dorsum of the foot  Masses:  None  Lymphadenopathy: None    Range of Motion   Ankle Joint   Normal  Subtalar Joint   Normal  Toes:   Normal    Muscle Strength   Ankle:   Normal  Toes:   Normal    Other   Tenderness:  Over 3rd 4th and 5th metatarsal on the dorsum of the foot  Instability:  None  Ankle Crepitus:  None  Sensation:   Normal  Achilles:  Normal  Forefoot:  Normal    Vascular Exam   Dorsalis Pedis:       Palpable  Capillary refill:     Normal          Imaging: X-rays ordered and images interpreted today personally by me of right foot appear normal.        Assessment: Right foot pain        Plan:  He was concerned that he might have a fracture.  The x-rays do not show that.  He is wearing normal shoe wear.  He declined a boot.  We will see him back as needed.      DISCLAIMER: This note may have been dictated using voice recognition software and may contain grammatical errors.     NOTE: Consult report sent to referring provider via EPIC EMR.  "

## 2020-01-10 ENCOUNTER — TELEPHONE (OUTPATIENT)
Dept: ENDOSCOPY | Facility: HOSPITAL | Age: 75
End: 2020-01-10

## 2020-01-10 DIAGNOSIS — K31.7 GASTRIC POLYP: Primary | ICD-10-CM

## 2020-01-21 ENCOUNTER — OFFICE VISIT (OUTPATIENT)
Dept: OPHTHALMOLOGY | Facility: CLINIC | Age: 75
End: 2020-01-21
Payer: MEDICARE

## 2020-01-21 DIAGNOSIS — H35.3134 ADVANCED ATROPHIC NONEXUDATIVE AGE-RELATED MACULAR DEGENERATION OF BOTH EYES WITH SUBFOVEAL INVOLVEMENT: Primary | ICD-10-CM

## 2020-01-21 PROCEDURE — 92134 CPTRZ OPH DX IMG PST SGM RTA: CPT | Mod: S$GLB,,, | Performed by: OPHTHALMOLOGY

## 2020-01-21 PROCEDURE — 92134 POSTERIOR SEGMENT OCT RETINA (OCULAR COHERENCE TOMOGRAPHY)-BOTH EYES: ICD-10-PCS | Mod: S$GLB,,, | Performed by: OPHTHALMOLOGY

## 2020-01-21 PROCEDURE — 92014 COMPRE OPH EXAM EST PT 1/>: CPT | Mod: S$GLB,,, | Performed by: OPHTHALMOLOGY

## 2020-01-21 PROCEDURE — 99999 PR PBB SHADOW E&M-EST. PATIENT-LVL III: ICD-10-PCS | Mod: PBBFAC,,, | Performed by: OPHTHALMOLOGY

## 2020-01-21 PROCEDURE — 92014 PR EYE EXAM, EST PATIENT,COMPREHESV: ICD-10-PCS | Mod: S$GLB,,, | Performed by: OPHTHALMOLOGY

## 2020-01-21 PROCEDURE — 99999 PR PBB SHADOW E&M-EST. PATIENT-LVL III: CPT | Mod: PBBFAC,,, | Performed by: OPHTHALMOLOGY

## 2020-01-21 NOTE — PROGRESS NOTES
===============================  Kalia Woods,  1/21/2020 today   74 y.o. female   Last visit [facility]: :7/22/2019   Last visit eye dept. Visit date not found  [facility]:  Corrected distance visual acuity was 20/30 -2 in the right eye and 20/25 -2 in the left eye.  Tonometry     Tonometry (Applanation, 10:39 AM)       Right Left    Pressure 17 16              Wearing Rx     Wearing Rx       Sphere Cylinder Axis Add    Right -0.75 +0.50 065 +2.50    Left -1.00 +0.50 112 +2.50              Manifest Refraction     Manifest Refraction       Sphere Cylinder Axis Dist [facility]    Right -0.75 +0.50 065 20/30+1    Left -1.00 +0.50 112 20/25+1               Not recorded        Chief Complaint   Patient presents with    SMD     6 month f/u SMD MOCT. pt states she's been having some pain in her upper eye off and on. haven't used any steroid drops in a long time. also having trouble reading clearly with her glasses. can see distance ok. started taking preservision eye vitamins also    Iritis       ________________  1/21/2020 today  HPI     SMD      Additional comments: 6 month f/u SMD MOCT. pt states she's been having   some pain in her upper eye off and on. haven't used any steroid drops in a   long time. also having trouble reading clearly with her glasses. can see   distance ok. started taking preservision eye vitamins also              Comments     ADVANCED SMD/ FOLLOWING Q 12 WEEKS  PCIOL OS SN60WF+22.5 / 04/21/17 CDE 12.69  PCIOL OD +22.0 SN60WF / CDE: 10.25 / 6/8/17  PVD  Bilateral iritis            Last edited by Roland Reyes on 1/21/2020 10:43 AM. (History)      Problem List Items Addressed This Visit        Eye/Vision problems    Advanced atrophic nonexudative age-related macular degeneration of both eyes with subfoveal involvement - Primary    Relevant Orders    Posterior Segment OCT Retina-Both eyes (Completed)            .RPED OU  Continue to follow  rtc 6 months, repeat moct  Instructed to call with any  worsening.  ===========================

## 2020-05-28 ENCOUNTER — TELEPHONE (OUTPATIENT)
Dept: ENDOSCOPY | Facility: HOSPITAL | Age: 75
End: 2020-05-28

## 2020-06-25 ENCOUNTER — TELEPHONE (OUTPATIENT)
Dept: ENDOSCOPY | Facility: HOSPITAL | Age: 75
End: 2020-06-25

## 2020-06-25 NOTE — TELEPHONE ENCOUNTER
Spoke with patient about instructions for EGD scheduled 7/8/20 at 1100.  Instructions mailed.  Covid-19 test 7/6/20 at 1100 at [facility].

## 2020-07-06 ENCOUNTER — LAB VISIT (OUTPATIENT)
Dept: URGENT CARE | Facility: CLINIC | Age: 75
End: 2020-07-06
Payer: MEDICARE

## 2020-07-06 PROCEDURE — U0003 INFECTIOUS AGENT DETECTION BY NUCLEIC ACID (DNA OR RNA); SEVERE ACUTE RESPIRATORY SYNDROME CORONAVIRUS 2 (SARS-COV-2) (CORONAVIRUS DISEASE [COVID-19]), AMPLIFIED PROBE TECHNIQUE, MAKING USE OF HIGH THROUGHPUT TECHNOLOGIES AS DESCRIBED BY CMS-2020-01-R: HCPCS

## 2020-07-07 LAB — SARS-COV-2 RNA RESP QL NAA+PROBE: NOT DETECTED

## 2020-07-08 ENCOUNTER — ANESTHESIA EVENT (OUTPATIENT)
Dept: ENDOSCOPY | Facility: HOSPITAL | Age: 75
End: 2020-07-08
Payer: MEDICARE

## 2020-07-08 ENCOUNTER — HOSPITAL ENCOUNTER (OUTPATIENT)
Facility: HOSPITAL | Age: 75
Discharge: HOME | End: 2020-07-08
Attending: INTERNAL MEDICINE | Admitting: INTERNAL MEDICINE
Payer: MEDICARE

## 2020-07-08 ENCOUNTER — ANESTHESIA (OUTPATIENT)
Dept: ENDOSCOPY | Facility: HOSPITAL | Age: 75
End: 2020-07-08
Payer: MEDICARE

## 2020-07-08 VITALS
RESPIRATION RATE: 20 BRPM | HEART RATE: 75 BPM | BODY MASS INDEX: 27.46 KG/M2 | WEIGHT: 155 LBS | HEIGHT: 63 IN | TEMPERATURE: 98 F | DIASTOLIC BLOOD PRESSURE: 72 MMHG | SYSTOLIC BLOOD PRESSURE: 150 MMHG | OXYGEN SATURATION: 100 %

## 2020-07-08 DIAGNOSIS — K31.7 GASTRIC POLYP: ICD-10-CM

## 2020-07-08 DIAGNOSIS — K31.9 GASTRIC LESION: Primary | ICD-10-CM

## 2020-07-08 DIAGNOSIS — K22.9 ESOPHAGEAL LESION: ICD-10-CM

## 2020-07-08 PROCEDURE — D9220A PRA ANESTHESIA: ICD-10-PCS | Mod: ANES,,, | Performed by: ANESTHESIOLOGY

## 2020-07-08 PROCEDURE — D9220A PRA ANESTHESIA: ICD-10-PCS | Mod: CRNA,,, | Performed by: NURSE ANESTHETIST, CERTIFIED REGISTERED

## 2020-07-08 PROCEDURE — 88305 TISSUE EXAM BY PATHOLOGIST: CPT | Performed by: PATHOLOGY

## 2020-07-08 PROCEDURE — 88342 CHG IMMUNOCYTOCHEMISTRY: ICD-10-PCS | Mod: 26,,, | Performed by: PATHOLOGY

## 2020-07-08 PROCEDURE — 37000009 HC ANESTHESIA EA ADD 15 MINS: Performed by: INTERNAL MEDICINE

## 2020-07-08 PROCEDURE — 88312 SPECIAL STAINS GROUP 1: CPT | Mod: 26,,, | Performed by: PATHOLOGY

## 2020-07-08 PROCEDURE — 25000003 PHARM REV CODE 250: Performed by: NURSE ANESTHETIST, CERTIFIED REGISTERED

## 2020-07-08 PROCEDURE — 25000242 PHARM REV CODE 250 ALT 637 W/ HCPCS: Performed by: NURSE ANESTHETIST, CERTIFIED REGISTERED

## 2020-07-08 PROCEDURE — 25000003 PHARM REV CODE 250: Performed by: INTERNAL MEDICINE

## 2020-07-08 PROCEDURE — 63600175 PHARM REV CODE 636 W HCPCS: Performed by: NURSE ANESTHETIST, CERTIFIED REGISTERED

## 2020-07-08 PROCEDURE — 88305 TISSUE EXAM BY PATHOLOGIST: CPT | Mod: 26,,, | Performed by: PATHOLOGY

## 2020-07-08 PROCEDURE — 88305 TISSUE EXAM BY PATHOLOGIST: ICD-10-PCS | Mod: 26,,, | Performed by: PATHOLOGY

## 2020-07-08 PROCEDURE — 88342 IMHCHEM/IMCYTCHM 1ST ANTB: CPT | Mod: 26,,, | Performed by: PATHOLOGY

## 2020-07-08 PROCEDURE — 43239 EGD BIOPSY SINGLE/MULTIPLE: CPT | Mod: ,,, | Performed by: INTERNAL MEDICINE

## 2020-07-08 PROCEDURE — D9220A PRA ANESTHESIA: Mod: ANES,,, | Performed by: ANESTHESIOLOGY

## 2020-07-08 PROCEDURE — 37000008 HC ANESTHESIA 1ST 15 MINUTES: Performed by: INTERNAL MEDICINE

## 2020-07-08 PROCEDURE — 88312 PR  SPECIAL STAINS,GROUP I: ICD-10-PCS | Mod: 26,,, | Performed by: PATHOLOGY

## 2020-07-08 PROCEDURE — D9220A PRA ANESTHESIA: Mod: CRNA,,, | Performed by: NURSE ANESTHETIST, CERTIFIED REGISTERED

## 2020-07-08 PROCEDURE — 94761 N-INVAS EAR/PLS OXIMETRY MLT: CPT

## 2020-07-08 PROCEDURE — 88342 IMHCHEM/IMCYTCHM 1ST ANTB: CPT | Performed by: PATHOLOGY

## 2020-07-08 PROCEDURE — 43239 PR EGD, FLEX, W/BIOPSY, SGL/MULTI: ICD-10-PCS | Mod: ,,, | Performed by: INTERNAL MEDICINE

## 2020-07-08 PROCEDURE — 88312 SPECIAL STAINS GROUP 1: CPT | Performed by: PATHOLOGY

## 2020-07-08 PROCEDURE — 27201012 HC FORCEPS, HOT/COLD, DISP: Performed by: INTERNAL MEDICINE

## 2020-07-08 PROCEDURE — 43239 EGD BIOPSY SINGLE/MULTIPLE: CPT | Performed by: INTERNAL MEDICINE

## 2020-07-08 RX ORDER — SODIUM CHLORIDE 0.9 % (FLUSH) 0.9 %
3 SYRINGE (ML) INJECTION
Status: DISCONTINUED | OUTPATIENT
Start: 2020-07-08 | End: 2020-07-08 | Stop reason: HOSPADM

## 2020-07-08 RX ORDER — PROPOFOL 10 MG/ML
VIAL (ML) INTRAVENOUS
Status: DISCONTINUED | OUTPATIENT
Start: 2020-07-08 | End: 2020-07-08

## 2020-07-08 RX ORDER — GLYCOPYRROLATE 0.2 MG/ML
INJECTION INTRAMUSCULAR; INTRAVENOUS
Status: DISCONTINUED | OUTPATIENT
Start: 2020-07-08 | End: 2020-07-08

## 2020-07-08 RX ORDER — ALBUTEROL SULFATE 90 UG/1
AEROSOL, METERED RESPIRATORY (INHALATION)
Status: DISCONTINUED | OUTPATIENT
Start: 2020-07-08 | End: 2020-07-08

## 2020-07-08 RX ORDER — SODIUM CHLORIDE 9 MG/ML
INJECTION, SOLUTION INTRAVENOUS CONTINUOUS
Status: DISCONTINUED | OUTPATIENT
Start: 2020-07-08 | End: 2020-07-08 | Stop reason: HOSPADM

## 2020-07-08 RX ORDER — PROPOFOL 10 MG/ML
VIAL (ML) INTRAVENOUS CONTINUOUS PRN
Status: DISCONTINUED | OUTPATIENT
Start: 2020-07-08 | End: 2020-07-08

## 2020-07-08 RX ORDER — LIDOCAINE HYDROCHLORIDE 20 MG/ML
INJECTION INTRAVENOUS
Status: DISCONTINUED | OUTPATIENT
Start: 2020-07-08 | End: 2020-07-08

## 2020-07-08 RX ORDER — SODIUM CHLORIDE 0.9 % (FLUSH) 0.9 %
10 SYRINGE (ML) INJECTION
Status: DISCONTINUED | OUTPATIENT
Start: 2020-07-08 | End: 2020-07-08 | Stop reason: HOSPADM

## 2020-07-08 RX ADMIN — PROPOFOL 50 MG: 10 INJECTION, EMULSION INTRAVENOUS at 11:07

## 2020-07-08 RX ADMIN — GLYCOPYRROLATE 0.2 MG: 0.2 INJECTION, SOLUTION INTRAMUSCULAR; INTRAVENOUS at 11:07

## 2020-07-08 RX ADMIN — ALBUTEROL SULFATE 2 PUFF: 90 AEROSOL, METERED RESPIRATORY (INHALATION) at 11:07

## 2020-07-08 RX ADMIN — SODIUM CHLORIDE: 0.9 INJECTION, SOLUTION INTRAVENOUS at 10:07

## 2020-07-08 RX ADMIN — LIDOCAINE HYDROCHLORIDE 100 MG: 20 INJECTION, SOLUTION INTRAVENOUS at 11:07

## 2020-07-08 RX ADMIN — PROPOFOL 150 MCG/KG/MIN: 10 INJECTION, EMULSION INTRAVENOUS at 11:07

## 2020-07-08 RX ADMIN — PROPOFOL 20 MG: 10 INJECTION, EMULSION INTRAVENOUS at 11:07

## 2020-07-08 NOTE — [employer] PATIENT INSTRUCTIONS
Discharge Summary/Instructions after an Endoscopic Procedure  Patient Name: Kalia Woods  Patient MRN: [id number]  Patient YOB: 1945 Wednesday, July 08, 2020  Ravinder Pastrana MD  RESTRICTIONS:  During your procedure today, you received medications for sedation.  These   medications may affect your judgment, balance and coordination.  Therefore,   for 24 hours, you have the following restrictions:   - DO NOT drive a car, operate machinery, make legal/financial decisions,   sign important papers or drink alcohol.    ACTIVITY:  Today: no heavy lifting, straining or running due to procedural   sedation/anesthesia.  The following day: return to full activity including work.  DIET:  Eat and drink normally unless instructed otherwise.     TREATMENT FOR COMMON SIDE EFFECTS:  - Mild abdominal pain, nausea, belching, bloating or excessive gas:  rest,   eat lightly and use a heating pad.  - Sore Throat: treat with throat lozenges and/or gargle with warm salt   water.  - Because air was used during the procedure, expelling large amounts of air   from your rectum or belching is normal.  - If a bowel prep was taken, you may not have a bowel movement for 1-3 days.    This is normal.  SYMPTOMS TO WATCH FOR AND REPORT TO YOUR PHYSICIAN:  1. Abdominal pain or bloating, other than gas cramps.  2. Chest pain.  3. Back pain.  4. Signs of infection such as: chills or fever occurring within 24 hours   after the procedure.  5. Rectal bleeding, which would show as bright red, maroon, or black stools.   (A tablespoon of blood from the rectum is not serious, especially if   hemorrhoids are present.)  6. Vomiting.  7. Weakness or dizziness.  GO DIRECTLY TO THE NEAREST EMERGENCY ROOM IF YOU HAVE ANY OF THE FOLLOWING:      Difficulty breathing              Chills and/or fever over 101 F   Persistent vomiting and/or vomiting blood   Severe abdominal pain   Severe chest pain   Black, tarry stools   Bleeding- more than one  tablespoon   Any other symptom or condition that you feel may need urgent attention  Your doctor recommends these additional instructions:  If any biopsies were taken, your doctors clinic will contact you in 1 to 2   weeks with any results.  - Discharge patient to home (ambulatory).   - Await pathology results.   - Repeat upper endoscopy in 1 year for surveillance based on pathology   results.  For questions, problems or results please call your physician - Ravinder Pastrana MD at Work:  [phone].  [facility], EMERGENCY ROOM PHONE NUMBER: [phone]  IF A COMPLICATION OR EMERGENCY SITUATION ARISES AND YOU ARE UNABLE TO REACH   YOUR PHYSICIAN - GO DIRECTLY TO THE EMERGENCY ROOM.  Ravinder Pastrana MD  7/8/2020 11:54:43 AM  This report has been verified and signed electronically.  [employer]

## 2020-07-08 NOTE — DISCHARGE SUMMARY
Discharge Summary/Instructions after an Endoscopic Procedure    Patient Name: Kalia Woods  Patient MRN: [id number]  Patient YOB: 1945 Wednesday, July 08, 2020  Ravinder Pastrana MD    RESTRICTIONS:  During your procedure today, you received medications for sedation.  These medications may affect your judgment, balance and coordination.  Therefore, for 24 hours, you have the following restrictions:     - DO NOT drive a car, operate machinery, make legal/financial decisions, sign important papers or drink alcohol.      ACTIVITY:  Today: no heavy lifting, straining or running due to procedural sedation/anesthesia.  The following day: return to full activity including work.    DIET:  Eat and drink normally unless instructed otherwise.     TREATMENT FOR COMMON SIDE EFFECTS:  - Mild abdominal pain, nausea, belching, bloating or excessive gas:  rest, eat lightly and use a heating pad.  - Sore Throat: treat with throat lozenges and/or gargle with warm salt water.  - Because air was used during the procedure, expelling large amounts of air from your rectum or belching is normal.  - If a bowel prep was taken, you may not have a bowel movement for 1-3 days.  This is normal.      SYMPTOMS TO WATCH FOR AND REPORT TO YOUR PHYSICIAN:  1. Abdominal pain or bloating, other than gas cramps.  2. Chest pain.  3. Back pain.  4. Signs of infection such as: chills or fever occurring within 24 hours after the procedure.  5. Rectal bleeding, which would show as bright red, maroon, or black stools. (A tablespoon of blood from the rectum is not serious, especially if hemorrhoids are present.)  6. Vomiting.  7. Weakness or dizziness.      GO DIRECTLY TO THE NEAREST EMERGENCY ROOM IF YOU HAVE ANY OF THE FOLLOWING:     Difficulty breathing              Chills and/or fever over 101 F   Persistent vomiting and/or vomiting blood   Severe abdominal pain   Severe chest pain   Black, tarry stools   Bleeding- more than one tablespoon   Any  other symptom or condition that you feel may need urgent attention    Your doctor recommends these additional instructions:  If any biopsies were taken, your doctors clinic will contact you in 1 to 2 weeks with any results.    - Discharge patient to home (ambulatory).   - Await pathology results.   - Repeat upper endoscopy in 1 year for surveillance based on pathology results.    For questions, problems or results please call your physician - Ravinder Pastrana MD at Work:  [phone].    [facility], EMERGENCY ROOM PHONE NUMBER: [phone]    IF A COMPLICATION OR EMERGENCY SITUATION ARISES AND YOU ARE UNABLE TO REACH YOUR PHYSICIAN - GO DIRECTLY TO THE EMERGENCY ROOM.

## 2020-07-08 NOTE — DISCHARGE INSTRUCTIONS
Upper GI Endoscopy     During endoscopy, a long, flexible tube is used to view the inside of your upper GI tract.      Upper GI endoscopy allows your healthcare provider to look directly into the beginning of your gastrointestinal (GI) tract. The esophagus, stomach, and duodenum (the first part of the small intestine) make up the upper GI tract.   Before the exam  Follow these and any other instructions you are given before your endoscopy. If you dont follow the healthcare providers instructions carefully, the test may need to be canceled or done over:  · Don't eat or drink anything after midnight the night before your exam. If your exam is in the afternoon, drink only clear liquids in the morning. Don't eat or drink anything for 8 hours before the exam. In some cases, you may be able to take medicines with sips of water until 2 hours before the procedure. Speak with your healthcare provider about this.   · Bring your X-rays and any other test results you have.  · Because you will be sedated, arrange for an adult to drive you home after the exam.  · Tell your healthcare provider before the exam if you are taking any medicines or have any medical problems.  The procedure  Here is what to expect:  · You will lie on the endoscopy table. Usually patients lie on the left side.  · You will be monitored and given oxygen.  · Your throat may be numbed with a spray or gargle. You are given medicine through an intravenous (IV) line that will help you relax and remain comfortable. You may be awake or asleep during the procedure.  · The healthcare provider will put the endoscope in your mouth and down your esophagus. It is thinner than most pieces of food that you swallow. It will not affect your breathing. The medicine helps keep you from gagging.  · Air is put into your GI tract to expand it. It can make you burp.  · During the procedure, the healthcare provider can take biopsies (tissue samples), remove abnormalities,  such as polyps, or treat abnormalities through a variety of devices placed through the endoscope. You will not feel this.   · The endoscope carries images of your upper GI tract to a video screen. If you are awake, you may be able to look at the images.  · After the procedure is done, you will rest for a time. An adult must drive you home.  When to call your healthcare provider  Contact your healthcare provider if you have:  · Black or tarry stools, or blood in your stool  · Fever  · Pain in your belly that does not go away  · Nausea and vomiting, or vomiting blood   Date Last Reviewed: 7/1/2016 © 2000-2017 [employer]. [address] [place] [address], [place] [address]. All rights reserved. This information is not intended as a substitute for professional medical care. Always follow your healthcare professional's instructions.        Anesthesia: General Anesthesia     You are watched continuously during your procedure by your anesthesia provider.     Youre due to have surgery. During surgery, youll be given medicine called anesthesia or anesthetic. This will keep you comfortable and pain-free. Your anesthesia provider will use general anesthesia.  What is general anesthesia?  General anesthesia puts you into a state like deep sleep. It goes into the bloodstream (IV anesthetics), into the lungs (gas anesthetics), or both. You feel nothing during the procedure. You will not remember it. During the procedure, the anesthesia provider monitors you continuously. He or she checks your heart rate and rhythm, blood pressure, breathing, and blood oxygen.  · IV anesthetics. IV anesthetics are given through an IV line in your arm. Theyre often given first. This is so you are asleep before a gas anesthetic is started. Some kinds of IV anesthetics relieve pain. Others relax you. Your doctor will decide which kind is best in your case.  · Gas anesthetics. Gas anesthetics are breathed into the lungs. They are often used  to keep you asleep. They can be given through a facemask or a tube placed in your larynx or trachea (breathing tube).  ¨ If you have a facemask, your anesthesia provider will most likely place it over your nose and mouth while youre still awake. Youll breathe oxygen through the mask as your IV anesthetic is started. Gas anesthetic may be added through the mask.  ¨ If you have a tube in the larynx or trachea, it will be inserted into your throat after youre asleep.  Anesthesia tools and medicines  You will likely have:  · IV anesthetics. These are put into an IV line into your bloodstream.  · Gas anesthetics. You breathe these anesthetics into your lungs, where they pass into your bloodstream.  · Pulse oximeter. This is a small clip that is attached to the end of your finger. This measures your blood oxygen level.  · Electrocardiography leads (electrodes). These are small sticky pads that are placed on your chest. They record your heart rate and rhythm.  · Blood pressure cuff. This reads your blood pressure.  Risks and possible complications  General anesthesia has some risks. These include:  · Breathing problems  · Nausea and vomiting  · Sore throat or hoarseness (usually temporary)  · Allergic reaction to the anesthetic  · Irregular heartbeat (rare)  · Cardiac arrest (rare)   Anesthesia safety  · Follow all instructions you are given for how long not to eat or drink before your procedure.  · Be sure your doctor knows what medicines and drugs you take. This includes over-the-counter medicines, herbs, supplements, alcohol or other drugs. You will be asked when those were last taken.  · Have an adult family member or friend drive you home after the procedure.  · For the first 24 hours after your surgery:  ¨ Do not drive or use heavy equipment.  ¨ Do not make important decisions or sign legal documents. If important decisions or signing legal documents is necessary during the first 24 hours after surgery, have a  trusted family member or spouse act on your behalf.  ¨ Avoid alcohol.  ¨ Have a responsible adult stay with you. He or she can watch for problems and help keep you safe.  Date Last Reviewed: 12/1/2016  © 6572-4194 [employer]. [address] [place] [address], [place] [address]. All rights reserved. This information is not intended as a substitute for professional medical care. Always follow your healthcare professional's instructions.

## 2020-07-08 NOTE — ANESTHESIA PREPROCEDURE EVALUATION
07/08/2020  Kalia Woods is a 75 y.o., female.  Past Medical History:   Diagnosis Date    Asthma     Salazar esophagus     Breast cancer     Cataract     Colon polyps     Degenerative joint disease involving multiple joints 8/19/2013    Diverticulosis of colon     Dysphagia     Elevated cholesterol     Esophageal lesion     Gastritis     GERD (gastroesophageal reflux disease)     Hemorrhoids     Hypertension     Macular degeneration     MI (myocardial infarction)     Thyroid disorder      Patient Active Problem List   Diagnosis    Hypertension    Elevated cholesterol    Thyroid disorder    Vitreous degeneration    Advanced atrophic nonexudative age-related macular degeneration of both eyes with subfoveal involvement    Breast cancer, female    Airway hyperreactivity    Chronic kidney disease (CKD), stage III (moderate)    Degenerative joint disease involving multiple joints    HLD (hyperlipidemia)    Avitaminosis D    Epiretinal membrane    Bilateral dry eyes    Lesion of stomach    Gastric lesion    Esophageal lesion    Salazar's esophagus with dysplasia    Trochanteric bursitis of both hips     Past Surgical History:   Procedure Laterality Date    BLADDER SURGERY      BREAST SURGERY      CATARACT EXTRACTION W/  INTRAOCULAR LENS IMPLANT Left 04/20/2017    CATARACT EXTRACTION W/  INTRAOCULAR LENS IMPLANT Right 06/08/2017    CHOLECYSTECTOMY      COLONOSCOPY W/ POLYPECTOMY  12/03/2018    outside facility    ESOPHAGOGASTRODUODENOSCOPY  12/03/2018    outside facility    ESOPHAGOGASTRODUODENOSCOPY N/A 12/28/2018    Procedure: EGD (ESOPHAGOGASTRODUODENOSCOPY);  Surgeon: Ravinder Pastrana MD;  Location: [address];  Service: Endoscopy;  Laterality: N/A;    ESOPHAGOGASTRODUODENOSCOPY N/A 4/1/2019    Procedure: EGD (ESOPHAGOGASTRODUODENOSCOPY);  Surgeon: Ravinder  TAYLOR Pastrana MD;  Location: [facility] ([address] [place] [address]);  Service: Endoscopy;  Laterality: N/A;    HYSTERECTOMY      LAPAROSCOPIC NISSEN FUNDOPLICATION      rectum tumor      TONSILLECTOMY       Review of patient's allergies indicates:   Allergen Reactions    Aspirin      Other reaction(s): Shortness Of Breath    Hydrocodone-acetaminophen Anaphylaxis    Penicillins Other (See Comments)    Phenergan [promethazine] Other (See Comments)    Hydrocodone     Iodine and iodide containing products      Other reaction(s): Swelling    Iodine containing multivitamin     Letrozole Other (See Comments)     Depression, suicidal thoughts.    Umeclidinium-vilanterol      Rapid heart rate, tongue swelling, and leg cramps    Sulfa (sulfonamide antibiotics)      Other reaction(s): Rash       Anesthesia Evaluation    I have reviewed the Patient Summary Reports.    I have reviewed the Nursing Notes. I have reviewed the NPO Status.      Review of Systems  Anesthesia Hx:  No problems with previous Anesthesia    Cardiovascular:   Hypertension Past MI     Pulmonary:   Asthma    Renal/:   Chronic Renal Disease    Hepatic/GI:   GERD        Physical Exam  General:  Well nourished    Airway/Jaw/Neck:  Airway Findings: Mouth Opening: Normal Tongue: Normal  General Airway Assessment: Adult  Jaw/Neck Findings:  Neck ROM: Normal ROM      Dental:  Dental Findings:        Mental Status:  Mental Status Findings:  Cooperative         Anesthesia Plan  Type of Anesthesia, risks & benefits discussed:  Anesthesia Type:  general  Patient's Preference:   Intra-op Monitoring Plan:   Intra-op Monitoring Plan Comments:   Post Op Pain Control Plan: multimodal analgesia  Post Op Pain Control Plan Comments:   Induction:   IV  Beta Blocker:  Patient is not currently on a Beta-Blocker (No further documentation required).       Informed Consent: Patient understands risks and agrees with Anesthesia plan.  Questions answered. Anesthesia consent signed with  patient.  ASA Score: 3     Day of Surgery Review of History & Physical:    H&P update referred to the surgeon.     Anesthesia Plan Notes: PHENERGAN ALLERGY        Ready For Surgery From Anesthesia Perspective.

## 2020-07-08 NOTE — TRANSFER OF CARE
"Anesthesia Transfer of Care Note    Patient: Kalia Woods    Procedure(s) Performed: Procedure(s) (LRB):  EGD (ESOPHAGOGASTRODUODENOSCOPY) (N/A)    Patient location: PACU    Anesthesia Type: general    Transport from OR: Transported from OR on 2-3 L/min O2 by NC with adequate spontaneous ventilation    Post pain: adequate analgesia    Post assessment: no apparent anesthetic complications    Post vital signs: stable    Level of consciousness: awake    Nausea/Vomiting: no nausea/vomiting    Complications: none    Transfer of care protocol was followed      Last vitals:   Visit Vitals  /68   Pulse 88   Temp 36.7 °C (98.1 °F) (Temporal)   Resp 18   Ht 5' 3" (1.6 m)   Wt 70.3 kg (155 lb)   SpO2 100%   Breastfeeding No   BMI 27.46 kg/m²     "

## 2020-07-08 NOTE — H&P
History & Physical - Short Stay  Gastroenterology      SUBJECTIVE:     Procedure: EGD    Chief Complaint/Indication for Procedure: Surveillance    History of Present Illness:  Patient is a 75 y.o. female presents with gastric cardia mucosa indeterminate for dysplasia S/P RFA here for surveillance    PTA Medications   Medication Sig    benazepril (LOTENSIN) 20 MG tablet TAKE 1 TABLET EVERY DAY (NEED MD APPOINTMENT)    colchicine 0.6 mg tablet Take 0.6 mg by mouth.    diltiaZEM (CARDIZEM CD) 180 MG 24 hr capsule Take 180 mg by mouth.    ergocalciferol, vitamin D2, 2,000 unit Tab Take 1,000 Units by mouth.    fluticasone-vilanterol (BREO ELLIPTA) 100-25 mcg/dose diskus inhaler INHALE 1 PUFF INTO THE LUNGS ONE TIME DAILY.    furosemide (LASIX) 20 MG tablet     isosorbide mononitrate (IMDUR) 60 MG 24 hr tablet     levothyroxine (SYNTHROID) 100 MCG tablet Take 100 mcg by mouth.    omeprazole (PRILOSEC) 40 MG capsule Take 1 capsule (40 mg total) by mouth 2 (two) times daily before meals.    potassium chloride SA (K-DUR,KLOR-CON) 10 MEQ tablet     simvastatin (ZOCOR) 10 MG tablet TAKE 1 TABLET EVERY NIGHT    zolpidem (AMBIEN) 10 mg Tab Take 1 tablet by mouth.    nitroGLYCERIN (NITROSTAT) 0.4 MG SL tablet as needed.    ondansetron (ZOFRAN) 4 MG tablet Take 4 mg by mouth.    vit C/vit E ac/lut/copper/zinc (PRESERVISION LUTEIN ORAL) Take by mouth.       Review of patient's allergies indicates:   Allergen Reactions    Aspirin      Other reaction(s): Shortness Of Breath    Hydrocodone-acetaminophen Anaphylaxis    Penicillins Other (See Comments)    Phenergan [promethazine] Other (See Comments)    Hydrocodone     Iodine and iodide containing products      Other reaction(s): Swelling    Iodine containing multivitamin     Letrozole Other (See Comments)     Depression, suicidal thoughts.    Umeclidinium-vilanterol      Rapid heart rate, tongue swelling, and leg cramps    Sulfa (sulfonamide antibiotics)       Other reaction(s): Rash        Past Medical History:   Diagnosis Date    Asthma     Salazar esophagus     Breast cancer     Cataract     Colon polyps     Degenerative joint disease involving multiple joints 8/19/2013    Diverticulosis of colon     Dysphagia     Elevated cholesterol     Esophageal lesion     Gastritis     GERD (gastroesophageal reflux disease)     Hemorrhoids     Hypertension     Macular degeneration     MI (myocardial infarction)     Thyroid disorder      Past Surgical History:   Procedure Laterality Date    BLADDER SURGERY      BREAST SURGERY      CATARACT EXTRACTION W/  INTRAOCULAR LENS IMPLANT Left 04/20/2017    CATARACT EXTRACTION W/  INTRAOCULAR LENS IMPLANT Right 06/08/2017    CHOLECYSTECTOMY      COLONOSCOPY W/ POLYPECTOMY  12/03/2018    outside facility    ESOPHAGOGASTRODUODENOSCOPY  12/03/2018    outside facility    ESOPHAGOGASTRODUODENOSCOPY N/A 12/28/2018    Procedure: EGD (ESOPHAGOGASTRODUODENOSCOPY);  Surgeon: Ravinder Pastrana MD;  Location: [facility] ([address] [place] [address]);  Service: Endoscopy;  Laterality: N/A;    ESOPHAGOGASTRODUODENOSCOPY N/A 4/1/2019    Procedure: EGD (ESOPHAGOGASTRODUODENOSCOPY);  Surgeon: Ravinder Pastrana MD;  Location: [facility] ([address] [place] [address]);  Service: Endoscopy;  Laterality: N/A;    HYSTERECTOMY      LAPAROSCOPIC NISSEN FUNDOPLICATION      rectum tumor      TONSILLECTOMY       Family History   Problem Relation Age of Onset    Diabetes Sister      Social History     Tobacco Use    Smoking status: Never Smoker    Smokeless tobacco: Never Used   Substance Use Topics    Alcohol use: No    Drug use: No       Review of Systems:  Constitutional: no fever or chills  Respiratory: no cough or shortness of breath  Cardiovascular: no chest pain or palpitations  Gastrointestinal: no nausea or vomiting, no abdominal pain or change in bowel habits    OBJECTIVE:     Vital Signs (Most Recent)  Temp: 97.5 °F (36.4 °C) (07/08/20 1027)  Pulse: 79 (07/08/20  1027)  Resp: 18 (07/08/20 1027)  BP: (!) 155/87 (07/08/20 1027)  SpO2: 100 % (07/08/20 1027)    Physical Exam:  General: well developed, well nourished  Lungs:  normal respiratory effort  Heart: regular rate, S1, S2 normal    Laboratory  CBC: No results for input(s): WBC, RBC, HGB, HCT, PLT, MCV, MCH, MCHC in the last 168 hours.  CMP: No results for input(s): GLU, CALCIUM, ALBUMIN, PROT, NA, K, CO2, CL, BUN, CREATININE, ALKPHOS, ALT, AST, BILITOT in the last 168 hours.  Coagulation: No results for input(s): LABPROT, INR, APTT in the last 168 hours.      Diagnostic Results:      ASSESSMENT/PLAN:     Abnormal gastric cardia mucosa s/p RFA here for surveillance    Plan: EGD    Anesthesia Plan: MAC    ASA Grade: ASA 3 - Patient with moderate systemic disease with functional limitations     The impression and plan was discussed in detail with the patient. All questions have been answered and the patient voices understanding of our plan at this point. The risk of the procedure was discussed in detail which includes but not limited to bleeding, infection, perforation in some cases requiring surgery with its spectrum of complications.

## 2020-07-08 NOTE — PLAN OF CARE
Patient discharged to home via wheelchair, escorted by her . Pt alert and talkative, vitals stable, tolerating PO intake. Discharge instructions (written and verbal) and follow-up information given to mother who verbalized understanding. C contact info provided for additional questions following discharge.

## 2020-07-08 NOTE — PLAN OF CARE
Patient in [facility] Recovery doing well; vitals are stable. SP02 monitoring, EKG, and BP monitoring in place. Will continue close monitoring

## 2020-07-08 NOTE — ANESTHESIA POSTPROCEDURE EVALUATION
Anesthesia Post Evaluation    Patient: Kalia Woods    Procedure(s) Performed: Procedure(s) (LRB):  EGD (ESOPHAGOGASTRODUODENOSCOPY) (N/A)    Final Anesthesia Type: general    Patient location during evaluation: PACU  Patient participation: Yes- Able to Participate  Level of consciousness: awake and alert and oriented  Post-procedure vital signs: reviewed and stable  Pain management: adequate  Airway patency: patent    PONV status at discharge: No PONV  Anesthetic complications: no      Cardiovascular status: hemodynamically stable  Respiratory status: unassisted and spontaneous ventilation  Hydration status: euvolemic  Follow-up not needed.          Vitals Value Taken Time   /72 07/08/20 1201   Temp 36.7 °C (98.1 °F) 07/08/20 1138   Pulse 75 07/08/20 1204   Resp 12 07/08/20 1204   SpO2 98 % 07/08/20 1204   Vitals shown include unvalidated device data.      No case tracking events are documented in the log.      Pain/Evelyn Score: Evelyn Score: 10 (7/8/2020 11:45 AM)

## 2020-07-13 LAB
FINAL PATHOLOGIC DIAGNOSIS: NORMAL
GROSS: NORMAL

## 2020-07-16 ENCOUNTER — TELEPHONE (OUTPATIENT)
Dept: ENDOSCOPY | Facility: HOSPITAL | Age: 75
End: 2020-07-16

## 2020-07-16 NOTE — TELEPHONE ENCOUNTER
Ravinder Pastrana MD   7/13/2020  8:18 PM      Please let the patient know the biopsies of the GE junction were negative for dysplasia or Salazar's. EGD in 2 years.     Patient informed

## 2020-08-05 ENCOUNTER — HOSPITAL ENCOUNTER (OUTPATIENT)
Dept: RADIOLOGY | Facility: HOSPITAL | Age: 75
Discharge: HOME | End: 2020-08-05
Attending: PODIATRIST
Payer: MEDICARE

## 2020-08-05 ENCOUNTER — OFFICE VISIT (OUTPATIENT)
Dept: PODIATRY | Facility: CLINIC | Age: 75
End: 2020-08-05
Payer: MEDICARE

## 2020-08-05 VITALS
HEIGHT: 63 IN | BODY MASS INDEX: 27.46 KG/M2 | SYSTOLIC BLOOD PRESSURE: 129 MMHG | WEIGHT: 155 LBS | DIASTOLIC BLOOD PRESSURE: 78 MMHG | HEART RATE: 79 BPM

## 2020-08-05 DIAGNOSIS — M79.671 RIGHT FOOT PAIN: ICD-10-CM

## 2020-08-05 DIAGNOSIS — M79.89 MASS OF SOFT TISSUE OF FOOT: Primary | ICD-10-CM

## 2020-08-05 PROCEDURE — 3074F SYST BP LT 130 MM HG: CPT | Mod: CPTII,S$GLB,, | Performed by: PODIATRIST

## 2020-08-05 PROCEDURE — 1101F PT FALLS ASSESS-DOCD LE1/YR: CPT | Mod: CPTII,S$GLB,, | Performed by: PODIATRIST

## 2020-08-05 PROCEDURE — 3078F DIAST BP <80 MM HG: CPT | Mod: CPTII,S$GLB,, | Performed by: PODIATRIST

## 2020-08-05 PROCEDURE — 1101F PR PT FALLS ASSESS DOC 0-1 FALLS W/OUT INJ PAST YR: ICD-10-PCS | Mod: CPTII,S$GLB,, | Performed by: PODIATRIST

## 2020-08-05 PROCEDURE — 1159F MED LIST DOCD IN RCRD: CPT | Mod: S$GLB,,, | Performed by: PODIATRIST

## 2020-08-05 PROCEDURE — 1159F PR MEDICATION LIST DOCUMENTED IN MEDICAL RECORD: ICD-10-PCS | Mod: S$GLB,,, | Performed by: PODIATRIST

## 2020-08-05 PROCEDURE — 1125F AMNT PAIN NOTED PAIN PRSNT: CPT | Mod: S$GLB,,, | Performed by: PODIATRIST

## 2020-08-05 PROCEDURE — 73630 X-RAY EXAM OF FOOT: CPT | Mod: TC,RT

## 2020-08-05 PROCEDURE — 3074F PR MOST RECENT SYSTOLIC BLOOD PRESSURE < 130 MM HG: ICD-10-PCS | Mod: CPTII,S$GLB,, | Performed by: PODIATRIST

## 2020-08-05 PROCEDURE — 99203 OFFICE O/P NEW LOW 30 MIN: CPT | Mod: S$GLB,,, | Performed by: PODIATRIST

## 2020-08-05 PROCEDURE — 1125F PR PAIN SEVERITY QUANTIFIED, PAIN PRESENT: ICD-10-PCS | Mod: S$GLB,,, | Performed by: PODIATRIST

## 2020-08-05 PROCEDURE — 99999 PR PBB SHADOW E&M-EST. PATIENT-LVL IV: CPT | Mod: PBBFAC,,, | Performed by: PODIATRIST

## 2020-08-05 PROCEDURE — 3078F PR MOST RECENT DIASTOLIC BLOOD PRESSURE < 80 MM HG: ICD-10-PCS | Mod: CPTII,S$GLB,, | Performed by: PODIATRIST

## 2020-08-05 PROCEDURE — 73630 XR FOOT COMPLETE 3 VIEW RIGHT: ICD-10-PCS | Mod: 26,RT,, | Performed by: RADIOLOGY

## 2020-08-05 PROCEDURE — 99203 PR OFFICE/OUTPT VISIT, NEW, LEVL III, 30-44 MIN: ICD-10-PCS | Mod: S$GLB,,, | Performed by: PODIATRIST

## 2020-08-05 PROCEDURE — 99999 PR PBB SHADOW E&M-EST. PATIENT-LVL IV: ICD-10-PCS | Mod: PBBFAC,,, | Performed by: PODIATRIST

## 2020-08-05 PROCEDURE — 73630 X-RAY EXAM OF FOOT: CPT | Mod: 26,RT,, | Performed by: RADIOLOGY

## 2020-08-14 NOTE — PROGRESS NOTES
Subjective:     Patient ID: Kalia Woods is a 75 y.o. female.    Chief Complaint: Foot Problem (c/o knots on foot. rates pain 5/10 right foot. wears casual shoes. non-diabetic Pt. last seen on 07/09/20 with PCP Dr. Spencer)    Kalia is a 75 y.o. female who presents to the podiatry clinic  with complaint of  right foot pain. Onset of the symptoms was several months ago. Precipitating event: no know injury. Current symptoms include: ability to bear weight, but with some pain. Aggravating factors: certain shoes. Symptoms have been intermittent. Patient has had prior foot problems. Evaluation to date: none. Treatment to date: none. Patients rates pain 5/10 on pain scale. Patient states she has has calcification knots before. Patient has no other pedal complaints at this time.     Patient Active Problem List   Diagnosis    Hypertension    Elevated cholesterol    Thyroid disorder    Vitreous degeneration    Advanced atrophic nonexudative age-related macular degeneration of both eyes with subfoveal involvement    Breast cancer, female    Airway hyperreactivity    Chronic kidney disease (CKD), stage III (moderate)    Degenerative joint disease involving multiple joints    HLD (hyperlipidemia)    Avitaminosis D    Epiretinal membrane    Bilateral dry eyes    Lesion of stomach    Gastric lesion    Esophageal lesion    Salazar's esophagus with dysplasia    Trochanteric bursitis of both hips    Gastric polyp       Medication List with Changes/Refills   Current Medications    BENAZEPRIL (LOTENSIN) 20 MG TABLET    TAKE 1 TABLET EVERY DAY (NEED MD APPOINTMENT)    COLCHICINE 0.6 MG TABLET    Take 0.6 mg by mouth.    DILTIAZEM (CARDIZEM CD) 180 MG 24 HR CAPSULE    Take 180 mg by mouth.    ERGOCALCIFEROL, VITAMIN D2, 2,000 UNIT TAB    Take 1,000 Units by mouth.    FLUTICASONE-VILANTEROL (BREO ELLIPTA) 100-25 MCG/DOSE DISKUS INHALER    INHALE 1 PUFF INTO THE LUNGS ONE TIME DAILY.    FUROSEMIDE (LASIX) 20 MG  TABLET        ISOSORBIDE MONONITRATE (IMDUR) 60 MG 24 HR TABLET        LEVOTHYROXINE (SYNTHROID) 100 MCG TABLET    Take 100 mcg by mouth.    NITROGLYCERIN (NITROSTAT) 0.4 MG SL TABLET    as needed.    OMEPRAZOLE (PRILOSEC) 40 MG CAPSULE    Take 1 capsule (40 mg total) by mouth 2 (two) times daily before meals.    ONDANSETRON (ZOFRAN) 4 MG TABLET    Take 4 mg by mouth.    POTASSIUM CHLORIDE SA (K-DUR,KLOR-CON) 10 MEQ TABLET        SIMVASTATIN (ZOCOR) 10 MG TABLET    TAKE 1 TABLET EVERY NIGHT    VIT C/VIT E AC/LUT/COPPER/ZINC (PRESERVISION LUTEIN ORAL)    Take by mouth.    ZOLPIDEM (AMBIEN) 10 MG TAB    Take 1 tablet by mouth.       Review of patient's allergies indicates:   Allergen Reactions    Aspirin      Other reaction(s): Shortness Of Breath    Hydrocodone-acetaminophen Anaphylaxis    Penicillins Other (See Comments)    Phenergan [promethazine] Other (See Comments)    Hydrocodone     Iodine and iodide containing products      Other reaction(s): Swelling    Iodine containing multivitamin     Letrozole Other (See Comments)     Depression, suicidal thoughts.    Umeclidinium-vilanterol      Rapid heart rate, tongue swelling, and leg cramps    Sulfa (sulfonamide antibiotics)      Other reaction(s): Rash       Past Surgical History:   Procedure Laterality Date    BLADDER SURGERY      BREAST SURGERY      CATARACT EXTRACTION W/  INTRAOCULAR LENS IMPLANT Left 04/20/2017    CATARACT EXTRACTION W/  INTRAOCULAR LENS IMPLANT Right 06/08/2017    CHOLECYSTECTOMY      COLONOSCOPY W/ POLYPECTOMY  12/03/2018    outside facility    ESOPHAGOGASTRODUODENOSCOPY  12/03/2018    outside facility    ESOPHAGOGASTRODUODENOSCOPY N/A 12/28/2018    Procedure: EGD (ESOPHAGOGASTRODUODENOSCOPY);  Surgeon: Ravinder Pastrana MD;  Location: [address];  Service: Endoscopy;  Laterality: N/A;    ESOPHAGOGASTRODUODENOSCOPY N/A 4/1/2019    Procedure: EGD (ESOPHAGOGASTRODUODENOSCOPY);  Surgeon: Ravinder Pastrana MD;   "[facility]  Service: Endoscopy;  Laterality: N/A;    ESOPHAGOGASTRODUODENOSCOPY N/A 7/8/2020    Procedure: EGD (ESOPHAGOGASTRODUODENOSCOPY);  Surgeon: Ravinder Pastrana MD;  [facility]  Service: Endoscopy;  Laterality: N/A;  Covid-19 test [facility] - pg    HYSTERECTOMY      LAPAROSCOPIC NISSEN FUNDOPLICATION      rectum tumor      TONSILLECTOMY         Family History   Problem Relation Age of Onset    Diabetes Sister        Social History     Socioeconomic History    Marital status:      Spouse name: Not on file    Number of children: Not on file    Years of education: Not on file    Highest education level: Not on file   Occupational History    Not on file   Social Needs    Financial resource strain: Not on file    Food insecurity     Worry: Not on file     Inability: Not on file    Transportation needs     Medical: Not on file     Non-medical: Not on file   Tobacco Use    Smoking status: Never Smoker    Smokeless tobacco: Never Used   Substance and Sexual Activity    Alcohol use: No    Drug use: No    Sexual activity: Not on file   Lifestyle    Physical activity     Days per week: Not on file     Minutes per session: Not on file    Stress: Not on file   Relationships    Social connections     Talks on phone: Not on file     Gets together: Not on file     Attends Hoahaoism service: Not on file     Active member of club or organization: Not on file     Attends meetings of clubs or organizations: Not on file     Relationship status: Not on file   Other Topics Concern    Not on file   Social History Narrative    Not on file       Vitals:    08/05/20 1519   BP: 129/78   Pulse: 79   Weight: 70.3 kg (155 lb)   Height: 5' 3" (1.6 m)   PainSc:   5   PainLoc: Foot       Review of Systems   Constitutional: Negative for chills and fever.   Respiratory: Negative for shortness of breath.    Cardiovascular: Negative for chest " pain, palpitations, orthopnea, claudication and leg swelling.   Gastrointestinal: Negative for diarrhea, nausea and vomiting.   Musculoskeletal: Negative for joint pain.   Skin: Negative for rash.   Neurological: Negative for dizziness, tingling, sensory change, focal weakness and weakness.   Psychiatric/Behavioral: Negative.              Objective:       PHYSICAL EXAM: Apperance: Alert and orient in no distress,well developed, and with good attention to grooming and body habits  Patient presents ambulating in casual shoes.   Lower Extremity Physical Exam:  VASCULAR: Dorsalis pedis pulses 2/4 bilateral and Posterior Tibial pulses 2/4 bilateral. Capillary fill time <4 seconds bilateral. No edema observed bilateral. Varicosities absent bilateral. Skin temperature of the lower extremities is warm to warm, proximal to distal. Hair growth WNL bilateral.  DERMATOLOGICAL: No skin rashes,lesions, or ulcers observed bilateral. Palpable fixated subcutaneous nodule noted to right lateral midfoot foot.  NEUROLOGICAL: Light touch, sharp-dull, proprioception all present and equal bilaterally.    MUSCULOSKELETAL: Muscle strength is 5/5 for foot inverters, everters, plantarflexors, and dorsiflexors. Muscle tone is normal. (+) pain on palpation of right foot mass.          Assessment:       Encounter Diagnoses   Name Primary?    Mass of soft tissue of foot - Right Foot Yes    Right foot pain          Plan:   Mass of soft tissue of foot - Right Foot    Right foot pain  -     X-Ray Foot Complete Right; Future; Expected date: 08/05/2020      I counseled the patient on her conditions, regarding findings of my examination, my impressions, and usual treatment plan.   I explained to the patient that etiology and treatment options for soft tissue mass including rest, ice messages,padding, topical cream, injections, new shoegear with orthotic inserts, and/or surgical treatment.   Patient agreed to padding.  Dispmaishads offloading pads to be  worn in shoes daily.   Ordered right foot x-rays to be completed today.   Patient to return if symptoms persist or worsen.                   Lola Ochoa DPM  [facility]

## 2021-01-26 ENCOUNTER — OFFICE VISIT (OUTPATIENT)
Dept: OPHTHALMOLOGY | Facility: CLINIC | Age: 76
End: 2021-01-26
Payer: MEDICARE

## 2021-01-26 DIAGNOSIS — H35.3134 ADVANCED ATROPHIC NONEXUDATIVE AGE-RELATED MACULAR DEGENERATION OF BOTH EYES WITH SUBFOVEAL INVOLVEMENT: Primary | ICD-10-CM

## 2021-01-26 DIAGNOSIS — H35.723 RETINAL PIGMENT EPITHELIAL DETACHMENT OF BOTH EYES: ICD-10-CM

## 2021-01-26 DIAGNOSIS — Z96.1 PSEUDOPHAKIA OF BOTH EYES: ICD-10-CM

## 2021-01-26 DIAGNOSIS — H35.372 EPIRETINAL MEMBRANE (ERM) OF LEFT EYE: ICD-10-CM

## 2021-01-26 PROCEDURE — 99999 PR PBB SHADOW E&M-EST. PATIENT-LVL III: ICD-10-PCS | Mod: PBBFAC,,, | Performed by: OPHTHALMOLOGY

## 2021-01-26 PROCEDURE — 99999 PR PBB SHADOW E&M-EST. PATIENT-LVL III: CPT | Mod: PBBFAC,,, | Performed by: OPHTHALMOLOGY

## 2021-01-26 PROCEDURE — 1126F PR PAIN SEVERITY QUANTIFIED, NO PAIN PRESENT: ICD-10-PCS | Mod: S$GLB,,, | Performed by: OPHTHALMOLOGY

## 2021-01-26 PROCEDURE — 92134 CPTRZ OPH DX IMG PST SGM RTA: CPT | Mod: S$GLB,,, | Performed by: OPHTHALMOLOGY

## 2021-01-26 PROCEDURE — 92014 COMPRE OPH EXAM EST PT 1/>: CPT | Mod: S$GLB,,, | Performed by: OPHTHALMOLOGY

## 2021-01-26 PROCEDURE — 92014 PR EYE EXAM, EST PATIENT,COMPREHESV: ICD-10-PCS | Mod: S$GLB,,, | Performed by: OPHTHALMOLOGY

## 2021-01-26 PROCEDURE — 92134 POSTERIOR SEGMENT OCT RETINA (OCULAR COHERENCE TOMOGRAPHY)-BOTH EYES: ICD-10-PCS | Mod: S$GLB,,, | Performed by: OPHTHALMOLOGY

## 2021-01-26 PROCEDURE — 1126F AMNT PAIN NOTED NONE PRSNT: CPT | Mod: S$GLB,,, | Performed by: OPHTHALMOLOGY

## 2021-01-26 RX ORDER — ALBUTEROL SULFATE 90 UG/1
2 AEROSOL, METERED RESPIRATORY (INHALATION)
COMMUNITY
Start: 2020-12-09 | End: 2021-12-09

## 2021-08-03 ENCOUNTER — OFFICE VISIT (OUTPATIENT)
Dept: URGENT CARE | Facility: CLINIC | Age: 76
End: 2021-08-03
Payer: MEDICARE

## 2021-08-03 VITALS
OXYGEN SATURATION: 98 % | DIASTOLIC BLOOD PRESSURE: 79 MMHG | TEMPERATURE: 98 F | WEIGHT: 159.81 LBS | BODY MASS INDEX: 28.31 KG/M2 | SYSTOLIC BLOOD PRESSURE: 133 MMHG | HEART RATE: 74 BPM

## 2021-08-03 DIAGNOSIS — R05.9 COUGH: Primary | ICD-10-CM

## 2021-08-03 DIAGNOSIS — I10 ESSENTIAL HYPERTENSION: ICD-10-CM

## 2021-08-03 DIAGNOSIS — R68.89 FLU-LIKE SYMPTOMS: ICD-10-CM

## 2021-08-03 DIAGNOSIS — U07.1 COVID-19 VIRUS DETECTED: ICD-10-CM

## 2021-08-03 DIAGNOSIS — R11.0 NAUSEA: ICD-10-CM

## 2021-08-03 LAB
CTP QC/QA: YES
SARS-COV-2 RDRP RESP QL NAA+PROBE: POSITIVE

## 2021-08-03 PROCEDURE — 99214 OFFICE O/P EST MOD 30 MIN: CPT | Mod: S$GLB,,, | Performed by: NURSE PRACTITIONER

## 2021-08-03 PROCEDURE — U0002 COVID-19 LAB TEST NON-CDC: HCPCS | Mod: QW,S$GLB,, | Performed by: NURSE PRACTITIONER

## 2021-08-03 PROCEDURE — 1101F PR PT FALLS ASSESS DOC 0-1 FALLS W/OUT INJ PAST YR: ICD-10-PCS | Mod: CPTII,S$GLB,, | Performed by: NURSE PRACTITIONER

## 2021-08-03 PROCEDURE — 3288F PR FALLS RISK ASSESSMENT DOCUMENTED: ICD-10-PCS | Mod: CPTII,S$GLB,, | Performed by: NURSE PRACTITIONER

## 2021-08-03 PROCEDURE — 99999 PR PBB SHADOW E&M-EST. PATIENT-LVL V: ICD-10-PCS | Mod: PBBFAC,,, | Performed by: NURSE PRACTITIONER

## 2021-08-03 PROCEDURE — 99214 PR OFFICE/OUTPT VISIT, EST, LEVL IV, 30-39 MIN: ICD-10-PCS | Mod: S$GLB,,, | Performed by: NURSE PRACTITIONER

## 2021-08-03 PROCEDURE — 3075F SYST BP GE 130 - 139MM HG: CPT | Mod: CPTII,S$GLB,, | Performed by: NURSE PRACTITIONER

## 2021-08-03 PROCEDURE — 1159F PR MEDICATION LIST DOCUMENTED IN MEDICAL RECORD: ICD-10-PCS | Mod: CPTII,S$GLB,, | Performed by: NURSE PRACTITIONER

## 2021-08-03 PROCEDURE — U0002: ICD-10-PCS | Mod: QW,S$GLB,, | Performed by: NURSE PRACTITIONER

## 2021-08-03 PROCEDURE — 1126F AMNT PAIN NOTED NONE PRSNT: CPT | Mod: CPTII,S$GLB,, | Performed by: NURSE PRACTITIONER

## 2021-08-03 PROCEDURE — 1159F MED LIST DOCD IN RCRD: CPT | Mod: CPTII,S$GLB,, | Performed by: NURSE PRACTITIONER

## 2021-08-03 PROCEDURE — 1160F PR REVIEW ALL MEDS BY PRESCRIBER/CLIN PHARMACIST DOCUMENTED: ICD-10-PCS | Mod: CPTII,S$GLB,, | Performed by: NURSE PRACTITIONER

## 2021-08-03 PROCEDURE — 99999 PR PBB SHADOW E&M-EST. PATIENT-LVL V: CPT | Mod: PBBFAC,,, | Performed by: NURSE PRACTITIONER

## 2021-08-03 PROCEDURE — 3075F PR MOST RECENT SYSTOLIC BLOOD PRESS GE 130-139MM HG: ICD-10-PCS | Mod: CPTII,S$GLB,, | Performed by: NURSE PRACTITIONER

## 2021-08-03 PROCEDURE — 3288F FALL RISK ASSESSMENT DOCD: CPT | Mod: CPTII,S$GLB,, | Performed by: NURSE PRACTITIONER

## 2021-08-03 PROCEDURE — 1126F PR PAIN SEVERITY QUANTIFIED, NO PAIN PRESENT: ICD-10-PCS | Mod: CPTII,S$GLB,, | Performed by: NURSE PRACTITIONER

## 2021-08-03 PROCEDURE — 3078F PR MOST RECENT DIASTOLIC BLOOD PRESSURE < 80 MM HG: ICD-10-PCS | Mod: CPTII,S$GLB,, | Performed by: NURSE PRACTITIONER

## 2021-08-03 PROCEDURE — 3078F DIAST BP <80 MM HG: CPT | Mod: CPTII,S$GLB,, | Performed by: NURSE PRACTITIONER

## 2021-08-03 PROCEDURE — 1101F PT FALLS ASSESS-DOCD LE1/YR: CPT | Mod: CPTII,S$GLB,, | Performed by: NURSE PRACTITIONER

## 2021-08-03 PROCEDURE — 1160F RVW MEDS BY RX/DR IN RCRD: CPT | Mod: CPTII,S$GLB,, | Performed by: NURSE PRACTITIONER

## 2021-08-03 RX ORDER — ONDANSETRON 4 MG/1
4 TABLET, ORALLY DISINTEGRATING ORAL EVERY 6 HOURS PRN
Qty: 10 TABLET | Refills: 0 | Status: SHIPPED | OUTPATIENT
Start: 2021-08-03

## 2021-08-03 RX ORDER — FLUTICASONE PROPIONATE 50 MCG
2 SPRAY, SUSPENSION (ML) NASAL DAILY
Qty: 16 G | Refills: 1 | Status: SHIPPED | OUTPATIENT
Start: 2021-08-03

## 2021-10-25 ENCOUNTER — TELEPHONE (OUTPATIENT)
Dept: OPHTHALMOLOGY | Facility: CLINIC | Age: 76
End: 2021-10-25
Payer: MEDICARE

## 2021-11-22 ENCOUNTER — OFFICE VISIT (OUTPATIENT)
Dept: OPHTHALMOLOGY | Facility: CLINIC | Age: 76
End: 2021-11-22
Payer: MEDICARE

## 2021-11-22 DIAGNOSIS — H35.3134 ADVANCED ATROPHIC NONEXUDATIVE AGE-RELATED MACULAR DEGENERATION OF BOTH EYES WITH SUBFOVEAL INVOLVEMENT: Primary | ICD-10-CM

## 2021-11-22 PROCEDURE — 92134 POSTERIOR SEGMENT OCT RETINA (OCULAR COHERENCE TOMOGRAPHY)-BOTH EYES: ICD-10-PCS | Mod: S$GLB,,, | Performed by: OPHTHALMOLOGY

## 2021-11-22 PROCEDURE — 92014 PR EYE EXAM, EST PATIENT,COMPREHESV: ICD-10-PCS | Mod: S$GLB,,, | Performed by: OPHTHALMOLOGY

## 2021-11-22 PROCEDURE — 92014 COMPRE OPH EXAM EST PT 1/>: CPT | Mod: S$GLB,,, | Performed by: OPHTHALMOLOGY

## 2021-11-22 PROCEDURE — 99999 PR PBB SHADOW E&M-EST. PATIENT-LVL III: CPT | Mod: PBBFAC,,, | Performed by: OPHTHALMOLOGY

## 2021-11-22 PROCEDURE — 99999 PR PBB SHADOW E&M-EST. PATIENT-LVL III: ICD-10-PCS | Mod: PBBFAC,,, | Performed by: OPHTHALMOLOGY

## 2021-11-22 PROCEDURE — 92134 CPTRZ OPH DX IMG PST SGM RTA: CPT | Mod: S$GLB,,, | Performed by: OPHTHALMOLOGY

## 2021-11-22 RX ORDER — TOBRAMYCIN AND DEXAMETHASONE 3; 1 MG/ML; MG/ML
1 SUSPENSION/ DROPS OPHTHALMIC 4 TIMES DAILY
Qty: 5 ML | Refills: 0 | Status: SHIPPED | OUTPATIENT
Start: 2021-11-22 | End: 2021-11-29

## 2022-01-01 NOTE — TRANSFER OF CARE
"Anesthesia Transfer of Care Note    Patient: Kalia Woods    Procedure(s) Performed: Procedure(s) (LRB):  ESOPHAGOGASTRODUODENOSCOPY (EGD) (N/A)    Patient location: [facility]    Anesthesia Type: general    Transport from OR: Transported from OR on room air with adequate spontaneous ventilation    Post pain: adequate analgesia    Post assessment: no apparent anesthetic complications and tolerated procedure well    Post vital signs: stable    Level of consciousness: awake, alert and oriented    Nausea/Vomiting: no nausea/vomiting    Complications: none    Transfer of care protocol was followed      Last vitals:   Visit Vitals  /76   Pulse 80   Temp 36.6 °C (97.9 °F)   Resp 16   Ht 5' 3" (1.6 m)   Wt 68 kg (150 lb)   SpO2 99%   Breastfeeding? No   BMI 26.57 kg/m²     "
Shoulder Dystocia/Other

## 2022-10-31 ENCOUNTER — OFFICE VISIT (OUTPATIENT)
Dept: OPHTHALMOLOGY | Facility: CLINIC | Age: 77
End: 2022-10-31
Payer: MEDICARE

## 2022-10-31 DIAGNOSIS — Z96.1 PSEUDOPHAKIA OF BOTH EYES: ICD-10-CM

## 2022-10-31 DIAGNOSIS — H16.9 KERATITIS, BILATERAL: ICD-10-CM

## 2022-10-31 DIAGNOSIS — H35.723 RETINAL PIGMENT EPITHELIAL DETACHMENT OF BOTH EYES: ICD-10-CM

## 2022-10-31 DIAGNOSIS — H35.372 EPIRETINAL MEMBRANE (ERM) OF LEFT EYE: ICD-10-CM

## 2022-10-31 DIAGNOSIS — H35.3134 ADVANCED ATROPHIC NONEXUDATIVE AGE-RELATED MACULAR DEGENERATION OF BOTH EYES WITH SUBFOVEAL INVOLVEMENT: Primary | ICD-10-CM

## 2022-10-31 PROCEDURE — 1160F PR REVIEW ALL MEDS BY PRESCRIBER/CLIN PHARMACIST DOCUMENTED: ICD-10-PCS | Mod: CPTII,S$GLB,, | Performed by: OPHTHALMOLOGY

## 2022-10-31 PROCEDURE — 99213 OFFICE O/P EST LOW 20 MIN: CPT | Mod: S$GLB,,, | Performed by: OPHTHALMOLOGY

## 2022-10-31 PROCEDURE — 1160F RVW MEDS BY RX/DR IN RCRD: CPT | Mod: CPTII,S$GLB,, | Performed by: OPHTHALMOLOGY

## 2022-10-31 PROCEDURE — 92134 CPTRZ OPH DX IMG PST SGM RTA: CPT | Mod: S$GLB,,, | Performed by: OPHTHALMOLOGY

## 2022-10-31 PROCEDURE — 99999 PR PBB SHADOW E&M-EST. PATIENT-LVL III: CPT | Mod: PBBFAC,,, | Performed by: OPHTHALMOLOGY

## 2022-10-31 PROCEDURE — 92134 POSTERIOR SEGMENT OCT RETINA (OCULAR COHERENCE TOMOGRAPHY)-BOTH EYES: ICD-10-PCS | Mod: S$GLB,,, | Performed by: OPHTHALMOLOGY

## 2022-10-31 PROCEDURE — 1159F MED LIST DOCD IN RCRD: CPT | Mod: CPTII,S$GLB,, | Performed by: OPHTHALMOLOGY

## 2022-10-31 PROCEDURE — 1159F PR MEDICATION LIST DOCUMENTED IN MEDICAL RECORD: ICD-10-PCS | Mod: CPTII,S$GLB,, | Performed by: OPHTHALMOLOGY

## 2022-10-31 PROCEDURE — 99999 PR PBB SHADOW E&M-EST. PATIENT-LVL III: ICD-10-PCS | Mod: PBBFAC,,, | Performed by: OPHTHALMOLOGY

## 2022-10-31 PROCEDURE — 99213 PR OFFICE/OUTPT VISIT, EST, LEVL III, 20-29 MIN: ICD-10-PCS | Mod: S$GLB,,, | Performed by: OPHTHALMOLOGY

## 2022-10-31 RX ORDER — MONTELUKAST SODIUM 10 MG/1
1 TABLET ORAL NIGHTLY
COMMUNITY
Start: 2022-10-05

## 2022-10-31 RX ORDER — NEOMYCIN SULFATE, POLYMYXIN B SULFATE AND DEXAMETHASONE 3.5; 10000; 1 MG/ML; [USP'U]/ML; MG/ML
1 SUSPENSION/ DROPS OPHTHALMIC 3 TIMES DAILY PRN
Qty: 5 ML | Refills: 0 | Status: SHIPPED | OUTPATIENT
Start: 2022-10-31 | End: 2022-11-07

## 2022-10-31 RX ORDER — TRAMADOL HYDROCHLORIDE 50 MG/1
50 TABLET ORAL
COMMUNITY

## 2022-10-31 NOTE — PROGRESS NOTES
"      ===============================  Date today is 10/31/2022  Kalia Woods is a 77 y.o. female  Last visit [facility]: :11/22/2021   Last visit eye dept. Visit date not found    Corrected distance visual acuity was 20/40 -1 in the right eye and 20/40 +1 in the left eye.  Tonometry       Tonometry (Applanation, 2:01 PM)         Right Left    Pressure 12 12                  Wearing Rx       Wearing Rx         Sphere Cylinder Axis Add    Right -0.75 Sphere  +2.75    Left -1.50 +1.25 103 +2.75      Age: 1yr    Type: PAL                  Manifest Refraction       Manifest Refraction         Sphere Cylinder Axis Dist VA    Right -0.50 +0.50 085 20/30    Left -0.75 +1.00 105 20/30                  Not recorded       Chief Complaint   Patient presents with    Macular Degeneration     "Having issues with va"       Problem List Items Addressed This Visit          Eye/Vision problems    Advanced atrophic nonexudative age-related macular degeneration of both eyes with subfoveal involvement - Primary    Epiretinal membrane     Other Visit Diagnoses       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  Check OU independently daily.    Gave my office and personal cell phone number.  ________________  10/31/2022 today  Kalia Woods    Advanced SMD OU, no SRN  OCT shows RPED OU  PCIOL OU  ERM OD  RPE mottling OS  Irritant keratitis - recommend Maxitrol TID OU for 1 week, then artificial tears    RTC 1 year  Instructed to call 24/7 for any worsening of vision or symptoms. Check OU daily.   Gave my office and cell phone number.    =============================      "

## 2023-04-18 ENCOUNTER — OFFICE VISIT (OUTPATIENT)
Dept: OPHTHALMOLOGY | Facility: CLINIC | Age: 78
End: 2023-04-18
Payer: MEDICARE

## 2023-04-18 DIAGNOSIS — H35.372 EPIRETINAL MEMBRANE (ERM) OF LEFT EYE: ICD-10-CM

## 2023-04-18 DIAGNOSIS — Z96.1 PSEUDOPHAKIA OF BOTH EYES: ICD-10-CM

## 2023-04-18 DIAGNOSIS — H35.723 RETINAL PIGMENT EPITHELIAL DETACHMENT OF BOTH EYES: ICD-10-CM

## 2023-04-18 DIAGNOSIS — H35.3134 ADVANCED ATROPHIC NONEXUDATIVE AGE-RELATED MACULAR DEGENERATION OF BOTH EYES WITH SUBFOVEAL INVOLVEMENT: Primary | ICD-10-CM

## 2023-04-18 PROCEDURE — 92134 CPTRZ OPH DX IMG PST SGM RTA: CPT | Mod: S$GLB,,, | Performed by: OPHTHALMOLOGY

## 2023-04-18 PROCEDURE — 99214 PR OFFICE/OUTPT VISIT, EST, LEVL IV, 30-39 MIN: ICD-10-PCS | Mod: S$GLB,,, | Performed by: OPHTHALMOLOGY

## 2023-04-18 PROCEDURE — 1160F RVW MEDS BY RX/DR IN RCRD: CPT | Mod: CPTII,S$GLB,, | Performed by: OPHTHALMOLOGY

## 2023-04-18 PROCEDURE — 99999 PR PBB SHADOW E&M-EST. PATIENT-LVL III: ICD-10-PCS | Mod: PBBFAC,,, | Performed by: OPHTHALMOLOGY

## 2023-04-18 PROCEDURE — 92134 POSTERIOR SEGMENT OCT RETINA (OCULAR COHERENCE TOMOGRAPHY)-BOTH EYES: ICD-10-PCS | Mod: S$GLB,,, | Performed by: OPHTHALMOLOGY

## 2023-04-18 PROCEDURE — 99999 PR PBB SHADOW E&M-EST. PATIENT-LVL III: CPT | Mod: PBBFAC,,, | Performed by: OPHTHALMOLOGY

## 2023-04-18 PROCEDURE — 1160F PR REVIEW ALL MEDS BY PRESCRIBER/CLIN PHARMACIST DOCUMENTED: ICD-10-PCS | Mod: CPTII,S$GLB,, | Performed by: OPHTHALMOLOGY

## 2023-04-18 PROCEDURE — 1159F MED LIST DOCD IN RCRD: CPT | Mod: CPTII,S$GLB,, | Performed by: OPHTHALMOLOGY

## 2023-04-18 PROCEDURE — 1159F PR MEDICATION LIST DOCUMENTED IN MEDICAL RECORD: ICD-10-PCS | Mod: CPTII,S$GLB,, | Performed by: OPHTHALMOLOGY

## 2023-04-18 PROCEDURE — 99214 OFFICE O/P EST MOD 30 MIN: CPT | Mod: S$GLB,,, | Performed by: OPHTHALMOLOGY

## 2023-04-18 NOTE — PROGRESS NOTES
"HPI     Blurred Vision            Comments: Advanced AMD          Comments    Patient states that she has been noticing blurriness OU off and on "almost   like when I had cataracts" - some pain OU when eyes are tired - occasional   glare and shadows in va when watching tv       ADVANCED SMD/ FOLLOWING Q 12 WEEKS  PCIOL OS SN60WF+22.5 / 04/21/17 CDE 12.69  PCIOL OD +22.0 SN60WF / CDE: 10.25 / 6/8/17  PVD  Bilateral iritis      ** ALLERGIC TO IODINE ON SKIN**            Last edited by Lana Daigle MA on 4/18/2023  1:07 PM.            Assessment /Plan     For exam results, see Encounter Report.      ICD-10-CM ICD-9-CM    1. Advanced atrophic nonexudative age-related macular degeneration of both eyes with subfoveal involvement  H35.6974 362.51 Posterior Segment OCT Retina-Both eyes done today   See below       2. 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 Pseudophakia of both eyes  Z96.1 V43.1       4. Epiretinal membrane (ERM) of left eye  H35.372 362.56 Posterior Segment OCT Retina-Both eyes        Disp MR today         RETURN TO CLINIC next available  with Dr Esquivel for Retina Eval to follow                 "

## 2023-06-02 ENCOUNTER — OFFICE VISIT (OUTPATIENT)
Dept: OPHTHALMOLOGY | Facility: CLINIC | Age: 78
End: 2023-06-02
Payer: MEDICARE

## 2023-06-02 DIAGNOSIS — H35.372 EPIRETINAL MEMBRANE (ERM) OF LEFT EYE: ICD-10-CM

## 2023-06-02 DIAGNOSIS — H35.723 RETINAL PIGMENT EPITHELIAL DETACHMENT OF BOTH EYES: ICD-10-CM

## 2023-06-02 DIAGNOSIS — Z96.1 PSEUDOPHAKIA OF BOTH EYES: ICD-10-CM

## 2023-06-02 DIAGNOSIS — H35.3134 ADVANCED ATROPHIC NONEXUDATIVE AGE-RELATED MACULAR DEGENERATION OF BOTH EYES WITH SUBFOVEAL INVOLVEMENT: Primary | ICD-10-CM

## 2023-06-02 PROCEDURE — 1160F PR REVIEW ALL MEDS BY PRESCRIBER/CLIN PHARMACIST DOCUMENTED: ICD-10-PCS | Mod: CPTII,S$GLB,, | Performed by: OPHTHALMOLOGY

## 2023-06-02 PROCEDURE — 92134 POSTERIOR SEGMENT OCT RETINA (OCULAR COHERENCE TOMOGRAPHY)-BOTH EYES: ICD-10-PCS | Mod: S$GLB,,, | Performed by: OPHTHALMOLOGY

## 2023-06-02 PROCEDURE — 92134 CPTRZ OPH DX IMG PST SGM RTA: CPT | Mod: S$GLB,,, | Performed by: OPHTHALMOLOGY

## 2023-06-02 PROCEDURE — 1159F MED LIST DOCD IN RCRD: CPT | Mod: CPTII,S$GLB,, | Performed by: OPHTHALMOLOGY

## 2023-06-02 PROCEDURE — 92014 PR EYE EXAM, EST PATIENT,COMPREHESV: ICD-10-PCS | Mod: S$GLB,,, | Performed by: OPHTHALMOLOGY

## 2023-06-02 PROCEDURE — 1159F PR MEDICATION LIST DOCUMENTED IN MEDICAL RECORD: ICD-10-PCS | Mod: CPTII,S$GLB,, | Performed by: OPHTHALMOLOGY

## 2023-06-02 PROCEDURE — 99999 PR PBB SHADOW E&M-EST. PATIENT-LVL III: CPT | Mod: PBBFAC,,, | Performed by: OPHTHALMOLOGY

## 2023-06-02 PROCEDURE — 92014 COMPRE OPH EXAM EST PT 1/>: CPT | Mod: S$GLB,,, | Performed by: OPHTHALMOLOGY

## 2023-06-02 PROCEDURE — 1160F RVW MEDS BY RX/DR IN RCRD: CPT | Mod: CPTII,S$GLB,, | Performed by: OPHTHALMOLOGY

## 2023-06-02 PROCEDURE — 99999 PR PBB SHADOW E&M-EST. PATIENT-LVL III: ICD-10-PCS | Mod: PBBFAC,,, | Performed by: OPHTHALMOLOGY

## 2023-06-02 NOTE — PROGRESS NOTES
===============================  Date today is 6/2/2023  Kalia Woods is a 78 y.o. female  Last visit [facility]: :10/31/2022   Last visit eye dept. 4/18/2023    Corrected distance visual acuity was 20/30 -1 in the right eye and 20/50 in the left eye.  Tonometry       Tonometry (Tonopen, 9:40 AM)         Right Left    Pressure 10 10                  Not recorded       Not recorded       Not recorded       Chief Complaint   Patient presents with    RPED     Patient here for RPED OU. Patient Ref by Dr. Kearney. Patient states she has a slight pain behind her right eye that comes throughout the day. Patient states new glasses are helping. Patient denies flashes of lights      HPI     RPED     Additional comments: Patient here for RPED OU. Patient Ref by Dr. Kearney.   Patient states she has a slight pain behind her right eye that comes   throughout the day. Patient states new glasses are helping. Patient denies   flashes of lights            Comments    ADVANCED SMD/ FOLLOWING Q 12 WEEKS  PCIOL OS SN60WF+22.5 / 04/21/17 CDE 12.69  PCIOL OD +22.0 SN60WF / CDE: 10.25 / 6/8/17  PVD  Bilateral iritis      ** ALLERGIC TO IODINE ON SKIN**          Last edited by Haim Castellanos on 6/2/2023  9:34 AM.      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  Check OU independently daily.    Gave my office and personal cell phone  number.  ________________  6/2/2023 today  Kalia Woods    RPED OU  OCT stable  ERM OS    Dry SMD  No bleeding  PCIOL OU    RTC 1 year  Instructed to call 24/7 for any worsening of vision or symptoms. Check OU daily.   Gave my office and cell phone number.    =============================

## 2023-11-01 ENCOUNTER — TELEPHONE (OUTPATIENT)
Dept: OPHTHALMOLOGY | Facility: CLINIC | Age: 78
End: 2023-11-01
Payer: MEDICARE

## 2023-11-01 NOTE — TELEPHONE ENCOUNTER
----- Message from Gretchen Cat sent at 11/1/2023  9:11 AM CDT -----  Patient is requesting a call back to schedule her follow up appt. Call back at [phone]

## 2024-07-16 ENCOUNTER — OFFICE VISIT (OUTPATIENT)
Dept: OPHTHALMOLOGY | Facility: CLINIC | Age: 79
End: 2024-07-16
Payer: MEDICARE

## 2024-07-16 DIAGNOSIS — Z96.1 PSEUDOPHAKIA OF BOTH EYES: ICD-10-CM

## 2024-07-16 DIAGNOSIS — H35.723 RETINAL PIGMENT EPITHELIAL DETACHMENT OF BOTH EYES: ICD-10-CM

## 2024-07-16 DIAGNOSIS — H35.3134 ADVANCED ATROPHIC NONEXUDATIVE AGE-RELATED MACULAR DEGENERATION OF BOTH EYES WITH SUBFOVEAL INVOLVEMENT: Primary | ICD-10-CM

## 2024-07-16 PROCEDURE — 1160F RVW MEDS BY RX/DR IN RCRD: CPT | Mod: CPTII,S$GLB,, | Performed by: OPHTHALMOLOGY

## 2024-07-16 PROCEDURE — 1159F MED LIST DOCD IN RCRD: CPT | Mod: CPTII,S$GLB,, | Performed by: OPHTHALMOLOGY

## 2024-07-16 PROCEDURE — 1126F AMNT PAIN NOTED NONE PRSNT: CPT | Mod: CPTII,S$GLB,, | Performed by: OPHTHALMOLOGY

## 2024-07-16 PROCEDURE — 92134 CPTRZ OPH DX IMG PST SGM RTA: CPT | Mod: S$GLB,,, | Performed by: OPHTHALMOLOGY

## 2024-07-16 PROCEDURE — 99214 OFFICE O/P EST MOD 30 MIN: CPT | Mod: S$GLB,,, | Performed by: OPHTHALMOLOGY

## 2024-07-16 PROCEDURE — 99999 PR PBB SHADOW E&M-EST. PATIENT-LVL III: CPT | Mod: PBBFAC,,, | Performed by: OPHTHALMOLOGY

## 2024-07-16 RX ORDER — INSULIN GLARGINE 100 [IU]/ML
10 INJECTION, SOLUTION SUBCUTANEOUS
COMMUNITY
Start: 2024-07-09

## 2024-07-16 RX ORDER — DICYCLOMINE HYDROCHLORIDE 10 MG/1
CAPSULE ORAL
COMMUNITY
Start: 2023-07-23

## 2024-07-16 RX ORDER — CLOPIDOGREL BISULFATE 75 MG/1
75 TABLET ORAL
COMMUNITY

## 2024-07-16 RX ORDER — PANTOPRAZOLE SODIUM 40 MG/1
40 TABLET, DELAYED RELEASE ORAL
COMMUNITY

## 2024-07-16 RX ORDER — PEN NEEDLE, DIABETIC 30 GX3/16"
NEEDLE, DISPOSABLE MISCELLANEOUS
COMMUNITY
Start: 2023-12-28

## 2024-07-16 RX ORDER — LANOLIN ALCOHOL/MO/W.PET/CERES
2 CREAM (GRAM) TOPICAL EVERY MORNING
COMMUNITY

## 2024-07-16 RX ORDER — BENAZEPRIL HYDROCHLORIDE 20 MG/1
1 TABLET ORAL DAILY
COMMUNITY
Start: 2024-06-03

## 2024-07-16 RX ORDER — BLOOD SUGAR DIAGNOSTIC
1 STRIP MISCELLANEOUS
COMMUNITY
Start: 2024-03-18

## 2024-07-16 RX ORDER — ALBUTEROL SULFATE 90 UG/1
2 AEROSOL, METERED RESPIRATORY (INHALATION)
COMMUNITY
Start: 2023-12-07 | End: 2024-12-06

## 2024-07-16 RX ORDER — DEXTROSE 4 G
TABLET,CHEWABLE ORAL
COMMUNITY
Start: 2023-11-28

## 2024-07-16 RX ORDER — ROSUVASTATIN CALCIUM 10 MG/1
10 TABLET, COATED ORAL
COMMUNITY
Start: 2024-05-07

## 2024-07-16 RX ORDER — FLUTICASONE FUROATE AND VILANTEROL 100; 25 UG/1; UG/1
1 POWDER RESPIRATORY (INHALATION)
COMMUNITY
Start: 2024-03-05 | End: 2025-03-05

## 2024-07-16 RX ORDER — LANCETS
1 EACH MISCELLANEOUS
COMMUNITY
Start: 2024-05-13

## 2024-07-16 RX ORDER — INSULIN ASPART 100 [IU]/ML
INJECTION, SOLUTION INTRAVENOUS; SUBCUTANEOUS
COMMUNITY
Start: 2024-01-02

## 2024-07-16 NOTE — PROGRESS NOTES
===============================  Date today is 7/16/2024  Kalia Woods is a 79 y.o. female  Last visit [facility]: :6/2/2023   Last visit eye dept. Visit date not found    Corrected distance visual acuity was 20/40 in the right eye and 20/60 in the left eye.  Tonometry       Tonometry (Applanation, 9:54 AM)         Right Left    Pressure 8 8                  Wearing Rx       Wearing Rx         Sphere Cylinder Axis Add    Right -0.75 Sphere  +2.75    Left -1.50 +1.25 103 +2.75      Type: PAL                  Manifest Refraction       Manifest Refraction         Sphere Cylinder Axis Dist VA    Right -0.25   20/30    Left -1.00 +1.25 100 20/40                  Not recorded       Chief Complaint   Patient presents with    Macular Degeneration     Yearly exam     HPI     Macular Degeneration            Comments: Yearly exam          Comments    ADVANCED SMD/ FOLLOWING Q 12 WEEKS  PCIOL OS SN60WF+22.5 / 04/21/17 CDE 12.69  PCIOL OD +22.0 SN60WF / CDE: 10.25 / 6/8/17  PVD  Bilateral iritis      ** ALLERGIC TO IODINE ON SKIN**          Last edited by Emilia Fuller on 7/16/2024  9:30 AM.      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  Check OU independently daily.    Gave my office and personal cell phone number.  ________________  7/16/2024 today  Kalia Woods         Ou smd  asymptomatic   os resolved rped on OCT  No injection needed   Ats  no help w rfx   C areds  PCIOL OU    Rtc 1 year  Instructed to call 24/7 for any  worsening of vision or symptoms. Check OU daily.   Gave my office and cell phone number.          :    =============================

## 2025-05-30 ENCOUNTER — TELEPHONE (OUTPATIENT)
Dept: OPHTHALMOLOGY | Facility: CLINIC | Age: 80
End: 2025-05-30
Payer: MEDICARE

## 2025-05-30 NOTE — TELEPHONE ENCOUNTER
Copied from CRM #[id number]. Topic: Appointments - Appointment Access  >> May 30, 2025 10:22 AM Crystal wrote:  Patient would like to get medical advice.  Symptoms (please be specific):   Pt is requesting to schedule an eye exam and new glasses with Dr Kearney   How long have you had these symptoms: N/A  Would you like a call back, or a response through your [facility] portal?: call back to pt   [phone] (  Pharmacy name and phone # (copy from chart):   N/A  Comments:

## 2025-08-18 ENCOUNTER — OFFICE VISIT (OUTPATIENT)
Dept: OPHTHALMOLOGY | Facility: CLINIC | Age: 80
End: 2025-08-18
Payer: MEDICARE

## 2025-08-18 DIAGNOSIS — H35.723 RETINAL PIGMENT EPITHELIAL DETACHMENT OF BOTH EYES: ICD-10-CM

## 2025-08-18 DIAGNOSIS — H26.492 PCO (POSTERIOR CAPSULAR OPACIFICATION), LEFT: Primary | ICD-10-CM

## 2025-08-18 DIAGNOSIS — Z96.1 PSEUDOPHAKIA OF BOTH EYES: ICD-10-CM

## 2025-08-18 DIAGNOSIS — H35.3134 ADVANCED ATROPHIC NONEXUDATIVE AGE-RELATED MACULAR DEGENERATION OF BOTH EYES WITH SUBFOVEAL INVOLVEMENT: ICD-10-CM

## 2025-08-18 PROCEDURE — 1126F AMNT PAIN NOTED NONE PRSNT: CPT | Mod: CPTII,S$GLB,, | Performed by: OPHTHALMOLOGY

## 2025-08-18 PROCEDURE — 92014 COMPRE OPH EXAM EST PT 1/>: CPT | Mod: 25,S$GLB,, | Performed by: OPHTHALMOLOGY

## 2025-08-18 PROCEDURE — 92134 CPTRZ OPH DX IMG PST SGM RTA: CPT | Mod: S$GLB,,, | Performed by: OPHTHALMOLOGY

## 2025-08-18 PROCEDURE — 99999 PR PBB SHADOW E&M-EST. PATIENT-LVL II: CPT | Mod: PBBFAC,,, | Performed by: OPHTHALMOLOGY

## 2025-08-18 PROCEDURE — 1160F RVW MEDS BY RX/DR IN RCRD: CPT | Mod: CPTII,S$GLB,, | Performed by: OPHTHALMOLOGY

## 2025-08-18 PROCEDURE — 1159F MED LIST DOCD IN RCRD: CPT | Mod: CPTII,S$GLB,, | Performed by: OPHTHALMOLOGY

## 2025-08-18 RX ORDER — VONOPRAZAN FUMARATE 26.72 MG/1
20 TABLET ORAL
COMMUNITY
Start: 2025-07-01

## 2025-08-18 RX ORDER — PREDNISOLONE ACETATE 10 MG/ML
1 SUSPENSION/ DROPS OPHTHALMIC 4 TIMES DAILY
Qty: 5 ML | Refills: 0 | Status: SHIPPED | OUTPATIENT
Start: 2025-08-18 | End: 2025-09-12